# Patient Record
Sex: MALE
[De-identification: names, ages, dates, MRNs, and addresses within clinical notes are randomized per-mention and may not be internally consistent; named-entity substitution may affect disease eponyms.]

---

## 2017-10-09 NOTE — RAD
THREE VIEWS RIGHT GREAT TOE:

 

HISTORY: 

Pain.  Possible infection.  Amputation 5 years ago.

 

FINDINGS: 

AP, lateral, and oblique views of right great toe are obtained.

 

There appear to be some areas of soft tissue heterogeneity medial to the right great toe extending a
ll the way to the possible cortical surface of the proximal phalanx distal aspect of the right great
 toe.  The patient has had previous amputation distal to this; however, the remaining bone in the pr
oximal phalanx especially distally appears to be somewhat frayed.  There may be involvement of this 
bone with infectious process including osteomyelitis.

 

There also appear to be some erosive changes involving the distal phalanx of the 2nd digit which is 
visualized.

 

IMPRESSION: 

Possible erosive osseous changes in the distal aspect of 1st and 2nd digits.

 

POS: AKIKO
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
## 2017-10-09 NOTE — PDOC.PN
- Subjective


Encounter Start Date: 10/09/17


Encounter Start Time: 10:05


Subjective: no pain or fever





- Objective


Resuscitation Status: 


 











Resuscitation Status           FULL:Full Resuscitation














MAR Reviewed: Yes


Vital Signs & Weight: 


 Vital Signs (12 hours)











  Temp Pulse Resp BP BP Pulse Ox


 


 10/09/17 08:17   67   136/87  


 


 10/09/17 05:25  98.4 F  76  18   131/84  97


 


 10/09/17 01:45  98.3 F  74  18   147/93 H  98


 


 10/09/17 01:31       98








 Weight











Weight                         175 lb 1.6 oz














I&O: 


 











 10/08/17 10/09/17 10/10/17





 06:59 06:59 06:59


 


Intake Total  650 


 


Balance  650 











Result Diagrams: 


 10/09/17 04:21





 10/09/17 04:21


Additional Labs: 


 Accuchecks











  10/09/17 10/09/17





  05:16 02:16


 


POC Glucose  331 H  376 H














Phys Exam





- Physical Examination


Constitutional: NAD


Neck: no JVD


Respiratory: clear to auscultation bilateral


Cardiovascular: RRR, no significant murmur


Gastrointestinal: soft, non-tender, positive bowel sounds


Musculoskeletal: no edema


draining lesion R great toe





Dx/Plan


(1) Osteomyelitis of toe of right foot


Code(s): M86.9 - OSTEOMYELITIS, UNSPECIFIED   Status: Acute   





(2) DM type 2, uncontrolled, with lower extremity ulcer


Code(s): E11.622 - TYPE 2 DIABETES MELLITUS WITH OTHER SKIN ULCER; E11.65 - 

TYPE 2 DIABETES MELLITUS WITH HYPERGLYCEMIA; L97.909 - NON-PRS CHRONIC ULC UNSP 

PRT OF UNSP LOW LEG W UNSP SEVERITY   Status: Chronic   





(3) HTN (hypertension)


Code(s): I10 - ESSENTIAL (PRIMARY) HYPERTENSION   Status: Chronic   


Qualifiers: 


   Hypertension type: essential hypertension   Qualified Code(s): I10 - 

Essential (primary) hypertension   





(4) Dyslipidemia


Code(s): E78.5 - HYPERLIPIDEMIA, UNSPECIFIED   Status: Acute   





- Plan


amputation planned


-: cont accu/ss/ etc


-: cont iv antibx for now





* .

## 2017-10-09 NOTE — OP
DATE OF PROCEDURE:  10/09/2017

 

PREOPERATIVE DIAGNOSES:  Diabetic infection of right foot with osteomyelitis, right second toe dista
l phalanx and right first toe and metatarsal with open neuropathic ulcer, plantar right second toe/m
etatarsal, septic diabetic foot.  No evidence of peripheral arterial disease.

 

POSTOPERATIVE DIAGNOSES:  Diabetic infection of right foot with osteomyelitis, right second toe dist
al phalanx and right first toe and metatarsal with open neuropathic ulcer, plantar right second toe/
metatarsal, septic diabetic foot.  No evidence of peripheral arterial disease.

 

PROCEDURE:  Amputation of right great toe and metatarsal.  Wound left open for healing by secondary 
intention.  Good pulsatile blood flow, ligation of pulsatile pedal vessels required.  Amputation of 
right second toe through the proximal phalanx.

 

SURGEON:  Dr. Héctor Millan.

 

ANESTHESIA:  General.  Local none.

 

FINDINGS:  Excellent blood supply.  Culture submitted from the right first toe.  Dry dressings appli
ed and plan to place a wound VAC tomorrow.

 

PROCEDURE IN DETAIL:  The patient was taken to the operating room where under general anesthesia, th
e right lower extremity was prepared with chloraprep, draped in routine fashion.  Incision made for 
an amputation of right second toe through the proximal phalanx down through the skin and subcutaneou
s tissue and transected with a bone cutter, resected proximally with rongeurs.  Hemostasis gained wi
th cautery.  Wound irrigated.  Connective tissue debrided.

 

Incision made for amputation of right first toe and metatarsal with a racquet type incision preservi
ng much plantar skin as possible.  Incision carried down through the skin and subcutaneous tissue an
d the metatarsal, transected with a bone cutter, resected proximally with rongeur.  There was pulsat
ile vessels requiring ligation with 4-0 Vicryl.  Good hemostasis obtained with the cautery.  Connect
erlinda tissue debrided sharply.  Good hemostasis obtained.  Irrigant applied.  Irrigant evacuated.  Nor
mal saline wet to dry dressings applied.  Patient tolerated the procedure well.

## 2017-10-09 NOTE — HP
PRIMARY CARE PHYSICIAN:  [facility].

 

REASON FOR ADMISSION:  Right great toe diabetic toe ulcer with cellulitis.

 

HISTORY OF PRESENT ILLNESS:  A 37-year-old  male who has history of 
diabetes type 2, diabetic neuropathy, and hypertension who was brought to 
emergency room for ulcer over right great toe and swelling, erythema and 
tenderness over right great toe.

 

The patient is Albanian speaking only, but his wife is present at bedside who 
provided history and who also helped interpreting to get history from patient.

 

About more than years ago, the patient had history of step down to nail and 
subsequently, he had minor lesion in the plantar aspect of the right great toe.
  About 2 weeks ago, the patient noticed blister and patient continued to be 
wear his shoe.  He was not feeling any sensation and blister breakdown and 
since then, the patient developed ulcer over right great toe.  The patient also 
noticed that right great toe was getting more swollen, erythematous, and 
tender.  The patient was having subjective feverish.  He was feeling chills at 
home.  The patient ignored to come to the emergency room.  Today, the patient 
was brought to the ER by his wife.

 

The patient does have a history of osteomyelitis and required distal right 
great toe amputation.  In the emergency room, this patient also found with 
hyperglycemia.  The patient was given vancomycin and Zosyn and IV fluid and we 
are admitting this patient for further treatment.

 

PAST MEDICAL HISTORY:  Diabetes type 2, insulin requiring; hypertension; 
diabetic neuropathy; dyslipidemia.

 

PAST SURGICAL HISTORY:  Distal right great toe amputation.

 

PAST PSYCHIATRIC HISTORY:  Anxiety and depression.

 

SOCIAL HISTORY:  The patient drinks alcohol socially.  He is .  He 
denies any tobacco or other illicit drug abuse.

 

ALLERGIES:  No known drug allergies.

 

CURRENT HOME MEDICATIONS:  NovoLog 70/30, 25 units subcutaneously twice daily, 
Lyrica 75 mg daily, clonidine 0.1 mg p.o. daily.

 

REVIEW OF SYSTEMS:  The following complete review of systems was negative, 
unless otherwise mentioned in the HPI or below:

Constitutional:  Weight loss or gain, ability to conduct usual activities.

Skin:  Rash, itching.

Eyes:  Double vision, pain.

ENT/Mouth:  Nose bleeding, neck stiffness, pain, tenderness.

Cardiovascular:  Palpitations, dyspnea on exertion, orthopnea.

Respiratory:  Shortness of breath, wheezing, cough, hemoptysis, fever or night 
sweats.

Gastrointestinal:  Poor appetite, abdominal pain, heartburn, nausea, vomiting, 
constipation, or diarrhea.

Genitourinary:  Urgency, frequency, dysuria, nocturia.

Musculoskeletal:  Pain, swelling.

Neurologic/Psychiatric:  Anxiety, depression.

Allergy/Immunologic:  Skin rash, bleeding tendency.

 

Please see my HPI for pertinent positives and negatives.  All other review of 
systems reviewed and negative except as mentioned in the HPI.

 

EMERGENCY ROOM COURSE:  The patient is given vancomycin with Zosyn 4.5 gram, 
and IV fluid 1 liter.

 

FAMILY HISTORY:  No strong family history of premature coronary artery disease, 
stroke or cancer, though diabetes runs among several family members.

 

PHYSICAL EXAMINATION:

VITAL SIGNS:  On arrival, blood pressure 143/92, pulse 94, respiratory rate 20, 
temperature 98.5, saturation 98% on room air, weight 79.3 kilograms.

GENERAL:  The patient is currently alert, awake, no obvious acute distress.

HEENT:  Head:  Normocephalic, atraumatic.  Eyes:  Pupils round, reactive to 
light.  Extraocular muscle intact.  ENT:  Oropharynx within normal limits.  
Moist mucous membranes.  No oral lesions.  No pharyngeal erythema, no exudate.

NECK:  Supple.  Range of motion is normal.  No meningeal signs of irritation.

LUNGS:  Clear to auscultation without any rhonchi or rales.

CARDIAC:  S1, S2 regular without any murmur.

ABDOMEN:  Soft, bowel sounds present, nontender, nondistended.  No organomegaly
, no mass, no suprapubic tenderness.

BACK EXAMINATION:  Unremarkable, no CVA tenderness.

EXTREMITIES:  Upper extremities:  Passive movement of all joints are normal.  
Lower extremities:  Right great toe is macerated, swollen, erythematous, and 
tender and also had ulcer on the plantar aspect, which draining yellowish pus-
like drainage.  The patient also has swelling around the dorsal aspect of right 
foot.  Good pulsation.

NEUROLOGIC:  Nonfocal examination.  The patient moves all 4 limbs.  Plantar 
bilateral flexor.

PSYCHIATRIC:  Normal affect.

 

SIGNIFICANT LABS:

1.  We are going to order toe x-ray.

2.  CBC:  WBC 6.5, hemoglobin 12.3, platelets 327, ESR 35.  BMP:  Sodium 133, 
potassium 4.0, chloride 99, carbon dioxide 24, anion gap 14, BUN 25, creatinine 
1.12, glucose 502, calcium 8.7, lactic acid 1.8.

3.  LFT:  AST 16, ALT 13, alkaline phosphatase 167, albumin 3.5.  CRP 5.91.

 

ASSESSMENT AND PLAN/IMPRESSION:

1.  Right great toe diabetic ulcer with cellulitis and concern of underlying 
osteomyelitis.  This patient will need admission.  He will need wound care team 
consultation for wound care.  We will obtain MRI of right lower extremity to 
rule out osteomyelitis.  The patient will be given broad spectrum antibiotic 
therapy with vancomycin and Zosyn.  We will consult Dr. Millan tomorrow.  The 
patient will need aggressive diabetes control.

2.  Hyperglycemia associated with diabetes.  The patient's blood sugar is out 
of control.  At this time, we will continue with Humulin regular insulin as per 
aggressive sliding scale and we will continue insulin 70/30, 25 units subcu 
b.i.d. and adjust insulin dose while in hospital.

3.  Dyslipidemia.  We will start Lipitor 10 mg p.o. at bedtime.

4.  Diabetic neuropathy.  We will continue Lyrica 75 mg p.o. daily.

5.  Hypertension.  We will continue clonidine 0.1 mg p.o. daily and consider 
lisinopril 5 mg p.o. daily.

6.  Deep venous thrombosis prophylaxis.  Lovenox 40 mg subcu daily.

7.  Anxiety with depression.  The patient is not on any specific treatment at 
this point.

8.  Deep venous thrombosis prophylaxis, Lovenox 40 mg subcu daily.

9.  Gastrointestinal prophylaxis.  Pepcid 20 mg p.o. b.i.d.

10.  Code status:  The patient is FULL CODE.  The patient's wife is surrogate 
decision maker.

 

Disposition plan based on clinical course.  We are expecting patient's stay in 
the hospital more than 2 midnights.  Plan of care discussed with the patient 
and his wife at bedside in the emergency room.

 

[place]

## 2017-10-10 NOTE — PDOC.PN
- Subjective


Encounter Start Date: 10/10/17


Encounter Start Time: 11:00


Subjective: pain is better, is amb in room





- Objective


Resuscitation Status: 


 











Resuscitation Status           FULL:Full Resuscitation














MAR Reviewed: Yes


Vital Signs & Weight: 


 Vital Signs (12 hours)











  Temp Pulse Resp BP BP Pulse Ox


 


 10/10/17 08:07  98.2 F  77  20   116/84  97


 


 10/10/17 08:00  98.4 F  79  16   125/81  97


 


 10/10/17 07:50   79   116/84  


 


 10/10/17 04:00  98.2 F  83  16   132/66  97


 


 10/10/17 00:00  98.2 F  83  14   120/79  96








 Weight











Admit Weight                   175 lb 1.6 oz


 


Weight                         175 lb 1.6 oz














I&O: 


 











 10/09/17 10/10/17 10/11/17





 06:59 06:59 06:59


 


Intake Total 650 450 


 


Balance 650 450 











Result Diagrams: 


 10/09/17 04:21





 10/09/17 04:21


Additional Labs: 


 Accuchecks











  10/10/17 10/10/17 10/09/17





  11:09 04:01 22:47


 


POC Glucose  126 H  205 H  227 H














  10/09/17 10/09/17





  21:06 16:37


 


POC Glucose  278 H  186 H














Phys Exam





- Physical Examination


HEENT: PERRLA, moist MMs


Neck: no JVD, supple


Respiratory: no wheezing, no rales


Cardiovascular: RRR, no significant murmur


Gastrointestinal: soft, non-tender, positive bowel sounds


Musculoskeletal: pulses present


right forefoot in dressing


Neurological: non-focal, moves all 4 limbs


Psychiatric: A&O x 3





Dx/Plan


(1) Osteomyelitis of toe of right foot


Code(s): M86.9 - OSTEOMYELITIS, UNSPECIFIED   Status: Acute   Comment: s/p 

right gr toe with metatarsal amp and sec toe amp, 10/9/17   





(2) Dyslipidemia


Code(s): E78.5 - HYPERLIPIDEMIA, UNSPECIFIED   Status: Chronic   





(3) DM type 2, uncontrolled, with lower extremity ulcer


Code(s): E11.622 - TYPE 2 DIABETES MELLITUS WITH OTHER SKIN ULCER; E11.65 - 

TYPE 2 DIABETES MELLITUS WITH HYPERGLYCEMIA; L97.909 - NON-PRS CHRONIC ULC UNSP 

PRT OF UNSP LOW LEG W UNSP SEVERITY   Status: Chronic   





(4) HTN (hypertension)


Code(s): I10 - ESSENTIAL (PRIMARY) HYPERTENSION   Status: Chronic   


Qualifiers: 


   Hypertension type: essential hypertension   Qualified Code(s): I10 - 

Essential (primary) hypertension   





- Plan


is on vanc and zosyn


-: wound vac needs to be arranged for home use


-: increase 70/30 to 30u bid


-: d/w pt and wife at bedside





* .








Review of Systems





- Medications/Allergies


Allergies/Adverse Reactions: 


 Allergies











Allergy/AdvReac Type Severity Reaction Status Date / Time


 


No Known Allergies Allergy   Unverified 03/15/14 19:43











Medications: 


 Current Medications





Acetaminophen (Tylenol)  650 mg PO Q4H PRN


   PRN Reason: Headache/Fever or Pain


Acetaminophen (Tylenol)  1,000 mg PO Q6H PRN


   PRN Reason: Moderate to Severe Pain (6-10)


   Last Admin: 10/09/17 17:17 Dose:  1,000 mg


Al Hydroxide/Mg Hydroxide (Maalox)  30 ml PO Q6H PRN


   PRN Reason: Heartburn  or Indigestion


Artificial Tears (Tears Naturale)  0 drop EA EYE PRN PRN


   PRN Reason: Dry Eyes


Atorvastatin Calcium (Lipitor)  10 mg PO HS [facility]


   Last Admin: 10/09/17 20:08 Dose:  10 mg


Dextrose/Water (Dextrose 50%)  25 gm SLOW IVP PRN PRN


   PRN Reason: Hypoglycemia


Enoxaparin Sodium (Lovenox)  40 mg SC 0900 [facility]


   Last Admin: 10/10/17 07:48 Dose:  40 mg


Famotidine (Pepcid)  20 mg PO BID [facility]


   Last Admin: 10/10/17 07:49 Dose:  20 mg


Glucagon (Glucagon)  1 mg IM PRN PRN


   PRN Reason: Hypoglycemia


Guaifenesin (Robitussin Sf)  200 mg PO Q4H PRN


   PRN Reason: Cough


Hydralazine HCl (Apresoline)  10 mg SLOW IVP Q4H PRN


   PRN Reason: Systolic BP > 180


Dextrose/Water (D5w)  1,000 mls @ 0 mls/hr IV .Q0M PRN; As Directed


   PRN Reason: Hypoglycemia


Piperacillin Sod/Tazobactam (Sod 4.5 gm/ Sodium Chloride)  100 mls @ 200 mls/hr 

IVPB Q6HR [facility]


   Last Admin: 10/10/17 05:46 Dose:  100 mls


Vancomycin HCl 1.25 gm/ Sodium (Chloride)  250 mls @ 166.667 mls/hr IVPB 0100,

1300 [facility]


   Last Admin: 10/10/17 01:29 Dose:  250 mls


Ibuprofen (Motrin)  600 mg PO Q6H PRN


   PRN Reason: Mild Pain (1-3)


Insulin Human Isoph/Insulin Regular (Humulin 70/30)  30 units SC BID [facility]


   Last Admin: 10/10/17 07:55 Dose:  30 unit


Insulin Human Regular (Humulin R)  0 units SC .MODERATE SLIDING SC PRN


   PRN Reason: Moderate Correctional Scale


   Last Admin: 10/10/17 05:50 Dose:  4 unit


Insulin Human Regular (Humulin R)  0 units SC .BEDTIME SLIDING SC PRN


   PRN Reason: Bedtime Correctional Scale


   Last Admin: 10/09/17 22:48 Dose:  2 unit


Lisinopril (Zestril)  5 mg PO DAILY [facility]


   Last Admin: 10/10/17 07:50 Dose:  5 mg


Loratadine (Claritin)  10 mg PO DAILYPRN PRN


   PRN Reason: Sinus Symptoms


Magnesium Hydroxide (Milk Of Magnesium)  30 ml PO DAILYPRN PRN


   PRN Reason: Constipation


Mineral Oil/White Petrolatum (Eucerin Cream)  0 gm TOP BIDPRN PRN


   PRN Reason: Dry Skin


Miscellaneous Medication (Pharmacy To Dose)  1 each IVPB PRN PRN


   PRN Reason: Pharmacy to dose


Ondansetron HCl (Zofran Odt)  4 mg PO Q6H PRN


   PRN Reason: Nausea/Vomiting


Ondansetron HCl (Zofran)  4 mg IVP Q6H PRN


   PRN Reason: Nausea/Vomiting


   Last Admin: 10/09/17 16:33 Dose:  4 mg


Polyethylene Glycol (Miralax)  17 gm PO DAILY [facility]


   Last Admin: 10/10/17 07:51 Dose:  17 gm


Pregabalin (Lyrica)  75 mg PO DAILY [facility]


   Last Admin: 10/10/17 07:49 Dose:  75 mg


Senna (Senokot)  2 tab PO HSPRN PRN


   PRN Reason: Constipation


Sodium Chloride (Ocean Nasal Spray 0.65%)  0 ml EA NARE QIDPRN PRN


   PRN Reason: Nasal Congestion


Sodium Chloride (Flush - Normal Saline)  10 ml IVF Q12HR [facility]


   Last Admin: 10/10/17 07:51 Dose:  10 ml


Sodium Chloride (Flush - Normal Saline)  10 ml IVF PRN PRN


   PRN Reason: Saline Flush


   Last Admin: 10/09/17 05:15 Dose:  10 ml


Tramadol HCl (Ultram)  50 mg PO Q6H PRN


   PRN Reason: Moderate Pain (4-6)


Tramadol HCl (Ultram)  100 mg PO Q6H PRN


   PRN Reason: Severe Pain (7-10)


   Last Admin: 10/10/17 08:47 Dose:  100 mg


Zolpidem Tartrate (Ambien)  5 mg PO HSPRN PRN


   PRN Reason: Insomnia

## 2017-10-10 NOTE — PRG
DATE OF SERVICE:  10/10/2017

 

SUBJECTIVE:  Mr. Urbina is doing well today.  He underwent a partial amputation of his foot yest
erday, partial amputation of the right second toe and amputation of first toe and metatarsal.  This 
morning, the wound looks good.  There is healthy granulation tissue.  He is having minimal pain.  He
 is afebrile.  Hemoglobin 11.6 and white count 5.7.  Basic metabolic profile is unremarkable.  Cultu
res intraoperatively revealed beta-hemolytic strep.  Blood cultures have been negative.

 

ASSESSMENT AND PLAN:  The patient has an open wound of the right foot.  The wound should heal withou
t problems.  There is no evidence of peripheral arterial disease.  He had pulsatile bleeding, requir
ing suture ligation cautery at the time of surgery.  Overall, the wound looks good.  We will apply t
he wound VAC today, Tuesday, and do not have to change until Friday.  We would recommend he be disch
arged home on Thursday with outpatient [facility] wound care appointment on Friday for outpatient VAC care.
  He can be discharged on oral antibiotics on Thursday.  He should keep his heel off the bed to prev
ent decubitus.  He should follow up in my office in 2-3 weeks or otherwise I will see him in wound c
are during wound VAC change in about 2 weeks.

## 2017-10-11 NOTE — PDOC.PN
- Subjective


Encounter Start Date: 10/11/17


Encounter Start Time: 11:00


Subjective: had lot of pain with dressing change yesterday


-: feels better now





- Objective


Resuscitation Status: 


 











Resuscitation Status           FULL:Full Resuscitation














MAR Reviewed: Yes


Vital Signs & Weight: 


 Vital Signs (12 hours)











  Temp Pulse Resp BP BP Pulse Ox


 


 10/11/17 09:22   72   151/98 H  


 


 10/11/17 08:00  97.8 F  72  16   


 


 10/11/17 07:13  97.8 F  72  16   151/98 H  97








 Weight











Admit Weight                   175 lb 1.6 oz


 


Weight                         175 lb 1.6 oz














I&O: 


 











 10/10/17 10/11/17 10/12/17





 06:59 06:59 06:59


 


Intake Total 450 1050 


 


Balance 450 1050 











Result Diagrams: 


 10/09/17 04:21





 10/09/17 04:21


Additional Labs: 


 Accuchecks











  10/11/17 10/11/17 10/10/17





  10:56 05:00 19:57


 


POC Glucose  219 H  169 H  136 H














  10/10/17





  16:13


 


POC Glucose  204 H














Phys Exam





- Physical Examination


HEENT: PERRLA, moist MMs


Neck: no JVD, supple


Respiratory: no wheezing, no rales


Cardiovascular: RRR, no significant murmur


Gastrointestinal: soft, non-tender, positive bowel sounds


Musculoskeletal: no edema, pulses present


right foot in wound vac


Neurological: non-focal, moves all 4 limbs


Psychiatric: A&O x 3





Dx/Plan


(1) Osteomyelitis of toe of right foot


Code(s): M86.9 - OSTEOMYELITIS, UNSPECIFIED   Status: Acute   Comment: s/p 

right gr toe with metatarsal amp and sec toe amp, 10/9/17   





(2) Dyslipidemia


Code(s): E78.5 - HYPERLIPIDEMIA, UNSPECIFIED   Status: Chronic   





(3) DM type 2, uncontrolled, with lower extremity ulcer


Code(s): E11.622 - TYPE 2 DIABETES MELLITUS WITH OTHER SKIN ULCER; E11.65 - 

TYPE 2 DIABETES MELLITUS WITH HYPERGLYCEMIA; L97.909 - NON-PRS CHRONIC ULC UNSP 

PRT OF UNSP LOW LEG W UNSP SEVERITY   Status: Chronic   





(4) HTN (hypertension)


Code(s): I10 - ESSENTIAL (PRIMARY) HYPERTENSION   Status: Chronic   


Qualifiers: 


   Hypertension type: essential hypertension   Qualified Code(s): I10 - 

Essential (primary) hypertension   





- Plan


is on vanc and zosyn


-: await full cultures


-: outpt wound care and vac to be arranged


-: dc plan in am


-: is on 70/30 insulin 30u bid





* .








Review of Systems





- Medications/Allergies


Allergies/Adverse Reactions: 


 Allergies











Allergy/AdvReac Type Severity Reaction Status Date / Time


 


No Known Allergies Allergy   Unverified 03/15/14 19:43











Medications: 


 Current Medications





Acetaminophen (Tylenol)  650 mg PO Q4H PRN


   PRN Reason: Headache/Fever or Pain


Acetaminophen (Tylenol)  1,000 mg PO Q6H PRN


   PRN Reason: Moderate to Severe Pain (6-10)


   Last Admin: 10/10/17 15:09 Dose:  1,000 mg


Al Hydroxide/Mg Hydroxide (Maalox)  30 ml PO Q6H PRN


   PRN Reason: Heartburn  or Indigestion


Artificial Tears (Tears Naturale)  0 drop EA EYE PRN PRN


   PRN Reason: Dry Eyes


Atorvastatin Calcium (Lipitor)  10 mg PO HS [facility]


   Last Admin: 10/10/17 21:33 Dose:  10 mg


Dextrose/Water (Dextrose 50%)  25 gm SLOW IVP PRN PRN


   PRN Reason: Hypoglycemia


Enoxaparin Sodium (Lovenox)  40 mg SC 0900 [facility]


   Last Admin: 10/11/17 09:22 Dose:  40 mg


Famotidine (Pepcid)  20 mg PO BID [facility]


   Last Admin: 10/11/17 09:21 Dose:  20 mg


Glucagon (Glucagon)  1 mg IM PRN PRN


   PRN Reason: Hypoglycemia


Guaifenesin (Robitussin Sf)  200 mg PO Q4H PRN


   PRN Reason: Cough


Hydralazine HCl (Apresoline)  10 mg SLOW IVP Q4H PRN


   PRN Reason: Systolic BP > 180


Dextrose/Water (D5w)  1,000 mls @ 0 mls/hr IV .Q0M PRN; As Directed


   PRN Reason: Hypoglycemia


Piperacillin Sod/Tazobactam (Sod 4.5 gm/ Sodium Chloride)  100 mls @ 200 mls/hr 

IVPB Q6HR [facility]


   Last Admin: 10/11/17 11:54 Dose:  100 mls


Vancomycin HCl 1.75 gm/ Sodium (Chloride)  500 mls @ 250 mls/hr IVPB 0200,1400 

[facility]


   Last Admin: 10/11/17 13:00 Dose:  500 mls


Ibuprofen (Motrin)  600 mg PO Q6H PRN


   PRN Reason: Mild Pain (1-3)


Insulin Human Isoph/Insulin Regular (Humulin 70/30)  30 units SC BID [facility]


   Last Admin: 10/11/17 09:23 Dose:  30 unit


Insulin Human Regular (Humulin R)  0 units SC .MODERATE SLIDING SC PRN


   PRN Reason: Moderate Correctional Scale


   Last Admin: 10/11/17 11:55 Dose:  4 unit


Insulin Human Regular (Humulin R)  0 units SC .BEDTIME SLIDING SC PRN


   PRN Reason: Bedtime Correctional Scale


   Last Admin: 10/09/17 22:48 Dose:  2 unit


Lisinopril (Zestril)  5 mg PO DAILY [facility]


   Last Admin: 10/11/17 09:22 Dose:  5 mg


Loratadine (Claritin)  10 mg PO DAILYPRN PRN


   PRN Reason: Sinus Symptoms


Magnesium Hydroxide (Milk Of Magnesium)  30 ml PO DAILYPRN PRN


   PRN Reason: Constipation


Mineral Oil/White Petrolatum (Eucerin Cream)  0 gm TOP BIDPRN PRN


   PRN Reason: Dry Skin


Miscellaneous Medication (Pharmacy To Dose)  1 each IVPB PRN PRN


   PRN Reason: Pharmacy to dose


Ondansetron HCl (Zofran Odt)  4 mg PO Q6H PRN


   PRN Reason: Nausea/Vomiting


Ondansetron HCl (Zofran)  4 mg IVP Q6H PRN


   PRN Reason: Nausea/Vomiting


   Last Admin: 10/09/17 16:33 Dose:  4 mg


Polyethylene Glycol (Miralax)  17 gm PO DAILY [facility]


   Last Admin: 10/11/17 09:22 Dose:  17 gm


Pregabalin (Lyrica)  75 mg PO DAILY [facility]


   Last Admin: 10/11/17 09:22 Dose:  75 mg


Senna (Senokot)  2 tab PO HSPRN PRN


   PRN Reason: Constipation


Sodium Chloride (Ocean Nasal Spray 0.65%)  0 ml EA NARE QIDPRN PRN


   PRN Reason: Nasal Congestion


Sodium Chloride (Flush - Normal Saline)  10 ml IVF Q12HR [facility]


   Last Admin: 10/11/17 09:26 Dose:  10 ml


Sodium Chloride (Flush - Normal Saline)  10 ml IVF PRN PRN


   PRN Reason: Saline Flush


   Last Admin: 10/09/17 05:15 Dose:  10 ml


Tramadol HCl (Ultram)  50 mg PO Q6H PRN


   PRN Reason: Moderate Pain (4-6)


Tramadol HCl (Ultram)  100 mg PO Q6H PRN


   PRN Reason: Severe Pain (7-10)


   Last Admin: 10/10/17 21:36 Dose:  100 mg


Zolpidem Tartrate (Ambien)  5 mg PO HSPRN PRN


   PRN Reason: Insomnia

## 2017-10-13 NOTE — PDOC.PN
- Subjective


Encounter Start Date: 10/13/17


Encounter Start Time: 11:50


Subjective: feels better





- Objective


Resuscitation Status: 


 











Resuscitation Status           FULL:Full Resuscitation














MAR Reviewed: Yes


Vital Signs & Weight: 


 Vital Signs (12 hours)











  Temp Pulse Resp BP Pulse Ox


 


 10/13/17 11:25  98.1 F  70  16  134/92 H  98


 


 10/13/17 08:00  98.1 F  70  16  


 


 10/13/17 07:17  97.4 F L  68  16  152/91 H  100


 


 10/13/17 04:40      98








 Weight











Admit Weight                   175 lb 1.6 oz


 


Weight                         175 lb 1.6 oz














I&O: 


 











 10/12/17 10/13/17 10/14/17





 06:59 06:59 06:59


 


Intake Total 1200 3270 


 


Balance 1200 3270 











Result Diagrams: 


 10/09/17 04:21





 10/13/17 08:18


Additional Labs: 


 Accuchecks











  10/13/17 10/13/17 10/12/17





  11:25 03:46 19:44


 


POC Glucose  272 H  215 H  204 H














  10/12/17





  16:35


 


POC Glucose  96














Phys Exam





- Physical Examination


HEENT: PERRLA, moist MMs


Neck: no JVD, supple


Respiratory: no wheezing, no rales


Cardiovascular: RRR, no significant murmur


Gastrointestinal: soft, non-tender, positive bowel sounds


Musculoskeletal: no edema, pulses present


Neurological: non-focal, moves all 4 limbs


right foot in wound vac


Psychiatric: A&O x 3





Dx/Plan


(1) Osteomyelitis of toe of right foot


Code(s): M86.9 - OSTEOMYELITIS, UNSPECIFIED   Status: Acute   Comment: s/p 

right gr toe with metatarsal amp and sec toe amp, 10/9/17   





(2) Dyslipidemia


Code(s): E78.5 - HYPERLIPIDEMIA, UNSPECIFIED   Status: Chronic   





(3) DM type 2, uncontrolled, with lower extremity ulcer


Code(s): E11.622 - TYPE 2 DIABETES MELLITUS WITH OTHER SKIN ULCER; E11.65 - 

TYPE 2 DIABETES MELLITUS WITH HYPERGLYCEMIA; L97.909 - NON-PRS CHRONIC ULC UNSP 

PRT OF UNSP LOW LEG W UNSP SEVERITY   Status: Chronic   





(4) HTN (hypertension)


Code(s): I10 - ESSENTIAL (PRIMARY) HYPERTENSION   Status: Chronic   


Qualifiers: 


   Hypertension type: essential hypertension   Qualified Code(s): I10 - 

Essential (primary) hypertension   





- Plan


awaiting wound vac for discharge


-: is on vanc and zosyn, augmentin for dc plan


-: is amb in hallway


-: may dc anytime if wound vac is arranged with wound care





* .








Review of Systems





- Medications/Allergies


Allergies/Adverse Reactions: 


 Allergies











Allergy/AdvReac Type Severity Reaction Status Date / Time


 


No Known Allergies Allergy   Unverified 03/15/14 19:43











Medications: 


 Current Medications





Acetaminophen (Tylenol)  650 mg PO Q4H PRN


   PRN Reason: Headache/Fever or Pain


Acetaminophen (Tylenol)  1,000 mg PO Q6H PRN


   PRN Reason: Moderate to Severe Pain (6-10)


   Last Admin: 10/10/17 15:09 Dose:  1,000 mg


Al Hydroxide/Mg Hydroxide (Maalox)  30 ml PO Q6H PRN


   PRN Reason: Heartburn  or Indigestion


Artificial Tears (Tears Naturale)  0 drop EA EYE PRN PRN


   PRN Reason: Dry Eyes


Atorvastatin Calcium (Lipitor)  10 mg PO HS [facility]


   Last Admin: 10/12/17 20:22 Dose:  10 mg


Dextrose/Water (Dextrose 50%)  25 gm SLOW IVP PRN PRN


   PRN Reason: Hypoglycemia


Enoxaparin Sodium (Lovenox)  40 mg SC 0900 [facility]


   Last Admin: 10/13/17 08:49 Dose:  40 mg


Famotidine (Pepcid)  20 mg PO BID [facility]


   Last Admin: 10/13/17 08:47 Dose:  20 mg


Glucagon (Glucagon)  1 mg IM PRN PRN


   PRN Reason: Hypoglycemia


Guaifenesin (Robitussin Sf)  200 mg PO Q4H PRN


   PRN Reason: Cough


Hydralazine HCl (Apresoline)  10 mg SLOW IVP Q4H PRN


   PRN Reason: Systolic BP > 180


Dextrose/Water (D5w)  1,000 mls @ 0 mls/hr IV .Q0M PRN; As Directed


   PRN Reason: Hypoglycemia


Piperacillin Sod/Tazobactam (Sod 4.5 gm/ Sodium Chloride)  100 mls @ 200 mls/hr 

IVPB Q6HR [facility]


   Last Admin: 10/13/17 14:04 Dose:  100 mls


Vancomycin HCl 1.75 gm/ Sodium (Chloride)  500 mls @ 250 mls/hr IVPB 0200,1400 

[facility]


   Last Admin: 10/13/17 02:49 Dose:  500 mls


Ibuprofen (Motrin)  600 mg PO Q6H PRN


   PRN Reason: Mild Pain (1-3)


Insulin Human Isoph/Insulin Regular (Humulin 70/30)  35 units SC BID [facility]


   Last Admin: 10/13/17 09:01 Dose:  35 unit


Insulin Human Regular (Humulin R)  0 units SC .MODERATE SLIDING SC PRN


   PRN Reason: Moderate Correctional Scale


   Last Admin: 10/13/17 14:10 Dose:  6 unit


Insulin Human Regular (Humulin R)  0 units SC .BEDTIME SLIDING SC PRN


   PRN Reason: Bedtime Correctional Scale


   Last Admin: 10/09/17 22:48 Dose:  2 unit


Lisinopril (Zestril)  5 mg PO DAILY [facility]


   Last Admin: 10/13/17 08:48 Dose:  5 mg


Loratadine (Claritin)  10 mg PO DAILYPRN PRN


   PRN Reason: Sinus Symptoms


Magnesium Hydroxide (Milk Of Magnesium)  30 ml PO DAILYPRN PRN


   PRN Reason: Constipation


Mineral Oil/White Petrolatum (Eucerin Cream)  0 gm TOP BIDPRN PRN


   PRN Reason: Dry Skin


Miscellaneous Medication (Pharmacy To Dose)  1 each IVPB PRN PRN


   PRN Reason: Pharmacy to dose


Morphine Sulfate (Morphine Sulfate)  2 mg SLOW IVP Q4H PRN


   PRN Reason: Chest Pain/BP Elevations


   Last Admin: 10/12/17 10:54 Dose:  2 mg


Ondansetron HCl (Zofran Odt)  4 mg PO Q6H PRN


   PRN Reason: Nausea/Vomiting


Ondansetron HCl (Zofran)  4 mg IVP Q6H PRN


   PRN Reason: Nausea/Vomiting


   Last Admin: 10/09/17 16:33 Dose:  4 mg


Polyethylene Glycol (Miralax)  17 gm PO DAILY [facility]


   Last Admin: 10/13/17 08:48 Dose:  17 gm


Pregabalin (Lyrica)  75 mg PO DAILY [facility]


   Last Admin: 10/13/17 08:48 Dose:  75 mg


Senna (Senokot)  2 tab PO HSPRN PRN


   PRN Reason: Constipation


Sodium Chloride (Ocean Nasal Spray 0.65%)  0 ml EA NARE QIDPRN PRN


   PRN Reason: Nasal Congestion


Sodium Chloride (Flush - Normal Saline)  10 ml IVF Q12HR [facility]


   Last Admin: 10/13/17 08:51 Dose:  10 ml


Sodium Chloride (Flush - Normal Saline)  10 ml IVF PRN PRN


   PRN Reason: Saline Flush


   Last Admin: 10/12/17 14:18 Dose:  10 ml


Tramadol HCl (Ultram)  50 mg PO Q6H PRN


   PRN Reason: Moderate Pain (4-6)


Tramadol HCl (Ultram)  100 mg PO Q6H PRN


   PRN Reason: Severe Pain (7-10)


   Last Admin: 10/13/17 00:28 Dose:  100 mg


Zolpidem Tartrate (Ambien)  5 mg PO HSPRN PRN


   PRN Reason: Insomnia

## 2017-10-14 NOTE — PDOC.PN
- Subjective


Encounter Start Date: 10/14/17


Encounter Start Time: 11:25


Subjective: feels better





- Objective


Resuscitation Status: 


 











Resuscitation Status           FULL:Full Resuscitation














MAR Reviewed: Yes


Vital Signs & Weight: 


 Vital Signs (12 hours)











  Temp Pulse Resp BP Pulse Ox


 


 10/14/17 11:52  98.5 F  79  18  149/101 H  98


 


 10/14/17 09:18   72   


 


 10/14/17 08:00  98.5 F  79  18  


 


 10/14/17 07:34  98.6 F  70  18  161/104 H  97


 


 10/14/17 04:00  98.3 F  85  18  150/92 H  92 L








 Weight











Admit Weight                   175 lb 1.6 oz


 


Weight                         175 lb 1.6 oz














I&O: 


 











 10/13/17 10/14/17 10/15/17





 06:59 06:59 06:59


 


Intake Total 3270 1200 


 


Balance 3270 1200 











Result Diagrams: 


 10/09/17 04:21





 10/13/17 08:18


Additional Labs: 


 Accuchecks











  10/14/17 10/13/17 10/13/17





  11:51 19:30 16:33


 


POC Glucose  135 H  152 H  119 H














Phys Exam





- Physical Examination


HEENT: PERRLA, moist MMs


Neck: no JVD, supple


Respiratory: no wheezing, no rales


Cardiovascular: RRR, no significant murmur


Gastrointestinal: soft, non-tender, positive bowel sounds


Musculoskeletal: no edema, pulses present


Neurological: non-focal, moves all 4 limbs


right foot in wound vac


Psychiatric: A&O x 3





Dx/Plan


(1) Osteomyelitis of toe of right foot


Code(s): M86.9 - OSTEOMYELITIS, UNSPECIFIED   Status: Acute   Comment: s/p 

right gr toe with metatarsal amp and sec toe amp, 10/9/17   





(2) Dyslipidemia


Code(s): E78.5 - HYPERLIPIDEMIA, UNSPECIFIED   Status: Chronic   





(3) DM type 2, uncontrolled, with lower extremity ulcer


Code(s): E11.622 - TYPE 2 DIABETES MELLITUS WITH OTHER SKIN ULCER; E11.65 - 

TYPE 2 DIABETES MELLITUS WITH HYPERGLYCEMIA; L97.909 - NON-PRS CHRONIC ULC UNSP 

PRT OF UNSP LOW LEG W UNSP SEVERITY   Status: Chronic   





(4) HTN (hypertension)


Code(s): I10 - ESSENTIAL (PRIMARY) HYPERTENSION   Status: Chronic   


Qualifiers: 


   Hypertension type: essential hypertension   Qualified Code(s): I10 - 

Essential (primary) hypertension   





- Plan


awaiting wound vac for outpt use


-: may anytime if above is arranged


-: is on iv antibiotics, switch to augmentin on dc





* .








Review of Systems





- Medications/Allergies


Allergies/Adverse Reactions: 


 Allergies











Allergy/AdvReac Type Severity Reaction Status Date / Time


 


No Known Allergies Allergy   Unverified 03/15/14 19:43











Medications: 


 Current Medications





Acetaminophen (Tylenol)  650 mg PO Q4H PRN


   PRN Reason: Headache/Fever or Pain


Acetaminophen (Tylenol)  1,000 mg PO Q6H PRN


   PRN Reason: Moderate to Severe Pain (6-10)


   Last Admin: 10/10/17 15:09 Dose:  1,000 mg


Al Hydroxide/Mg Hydroxide (Maalox)  30 ml PO Q6H PRN


   PRN Reason: Heartburn  or Indigestion


Artificial Tears (Tears Naturale)  0 drop EA EYE PRN PRN


   PRN Reason: Dry Eyes


Atorvastatin Calcium (Lipitor)  10 mg PO HS [facility]


   Last Admin: 10/13/17 20:33 Dose:  10 mg


Dextrose/Water (Dextrose 50%)  25 gm SLOW IVP PRN PRN


   PRN Reason: Hypoglycemia


Enoxaparin Sodium (Lovenox)  40 mg SC 0900 [facility]


   Last Admin: 10/14/17 09:18 Dose:  40 mg


Famotidine (Pepcid)  20 mg PO BID [facility]


   Last Admin: 10/14/17 09:18 Dose:  20 mg


Glucagon (Glucagon)  1 mg IM PRN PRN


   PRN Reason: Hypoglycemia


Guaifenesin (Robitussin Sf)  200 mg PO Q4H PRN


   PRN Reason: Cough


Hydralazine HCl (Apresoline)  10 mg SLOW IVP Q4H PRN


   PRN Reason: Systolic BP > 180


Dextrose/Water (D5w)  1,000 mls @ 0 mls/hr IV .Q0M PRN; As Directed


   PRN Reason: Hypoglycemia


Vancomycin HCl 1.75 gm/ Sodium (Chloride)  500 mls @ 250 mls/hr IVPB 0200,1400 

[facility]


   Last Admin: 10/14/17 13:49 Dose:  500 mls


Ibuprofen (Motrin)  600 mg PO Q6H PRN


   PRN Reason: Mild Pain (1-3)


Insulin Human Isoph/Insulin Regular (Humulin 70/30)  35 units SC BID [facility]


   Last Admin: 10/14/17 09:18 Dose:  35 unit


Insulin Human Regular (Humulin R)  0 units SC .MODERATE SLIDING SC PRN


   PRN Reason: Moderate Correctional Scale


   Last Admin: 10/13/17 14:10 Dose:  6 unit


Insulin Human Regular (Humulin R)  0 units SC .BEDTIME SLIDING SC PRN


   PRN Reason: Bedtime Correctional Scale


   Last Admin: 10/09/17 22:48 Dose:  2 unit


Lisinopril (Zestril)  5 mg PO DAILY [facility]


   Last Admin: 10/14/17 09:18 Dose:  5 mg


Loratadine (Claritin)  10 mg PO DAILYPRN PRN


   PRN Reason: Sinus Symptoms


Magnesium Hydroxide (Milk Of Magnesium)  30 ml PO DAILYPRN PRN


   PRN Reason: Constipation


Mineral Oil/White Petrolatum (Eucerin Cream)  0 gm TOP BIDPRN PRN


   PRN Reason: Dry Skin


Miscellaneous Medication (Pharmacy To Dose)  1 each IVPB PRN PRN


   PRN Reason: Pharmacy to dose


Morphine Sulfate (Morphine Sulfate)  2 mg SLOW IVP Q4H PRN


   PRN Reason: Chest Pain/BP Elevations


   Last Admin: 10/12/17 10:54 Dose:  2 mg


Ondansetron HCl (Zofran Odt)  4 mg PO Q6H PRN


   PRN Reason: Nausea/Vomiting


Ondansetron HCl (Zofran)  4 mg IVP Q6H PRN


   PRN Reason: Nausea/Vomiting


   Last Admin: 10/09/17 16:33 Dose:  4 mg


Polyethylene Glycol (Miralax)  17 gm PO DAILY [facility]


   Last Admin: 10/14/17 09:18 Dose:  17 gm


Pregabalin (Lyrica)  75 mg PO DAILY [facility]


   Last Admin: 10/14/17 09:17 Dose:  75 mg


Senna (Senokot)  2 tab PO HSPRN PRN


   PRN Reason: Constipation


Sodium Chloride (Ocean Nasal Spray 0.65%)  0 ml EA NARE QIDPRN PRN


   PRN Reason: Nasal Congestion


Sodium Chloride (Flush - Normal Saline)  10 ml IVF Q12HR [facility]


   Last Admin: 10/14/17 09:19 Dose:  10 ml


Sodium Chloride (Flush - Normal Saline)  10 ml IVF PRN PRN


   PRN Reason: Saline Flush


   Last Admin: 10/12/17 14:18 Dose:  10 ml


Tramadol HCl (Ultram)  50 mg PO Q6H PRN


   PRN Reason: Moderate Pain (4-6)


Tramadol HCl (Ultram)  100 mg PO Q6H PRN


   PRN Reason: Severe Pain (7-10)


   Last Admin: 10/13/17 00:28 Dose:  100 mg


Zolpidem Tartrate (Ambien)  5 mg PO HSPRN PRN


   PRN Reason: Insomnia

## 2017-10-15 NOTE — PDOC.PN
- Subjective


Encounter Start Date: 10/15/17


Encounter Start Time: 07:55


Subjective: feels better, no pain





- Objective


Resuscitation Status: 


 











Resuscitation Status           FULL:Full Resuscitation














MAR Reviewed: Yes


Vital Signs & Weight: 


 Vital Signs (12 hours)











  Temp Pulse Resp BP BP Pulse Ox


 


 10/15/17 08:06   73   162/99 H  


 


 10/15/17 08:00  98.4 F  73  16   


 


 10/15/17 07:58  98.4 F  74  16   162/99 H  98


 


 10/15/17 04:00  98.1 F  77  20   146/89 H  96


 


 10/15/17 00:32  98.8 F  72  20   127/77  97








 Weight











Admit Weight                   175 lb 1.6 oz


 


Weight                         175 lb 1.6 oz














I&O: 


 











 10/14/17 10/15/17 10/16/17





 06:59 06:59 06:59


 


Intake Total 1200 2100 480


 


Balance 1200 2100 480











Result Diagrams: 


 10/09/17 04:21





 10/13/17 08:18


Additional Labs: 


 Accuchecks











  10/15/17 10/15/17 10/14/17





  11:12 04:35 19:32


 


POC Glucose  100  100  202 H














  10/14/17 10/14/17





  15:25 11:51


 


POC Glucose  145 H  135 H














Phys Exam





- Physical Examination


HEENT: PERRLA, moist MMs


Neck: no JVD, supple


Respiratory: no wheezing, no rales


Cardiovascular: RRR, no significant murmur


Gastrointestinal: soft, non-tender, positive bowel sounds


Musculoskeletal: pulses present


right foot in wound vac


Neurological: non-focal, moves all 4 limbs


Psychiatric: A&O x 3





Dx/Plan


(1) Osteomyelitis of toe of right foot


Code(s): M86.9 - OSTEOMYELITIS, UNSPECIFIED   Status: Acute   Comment: s/p 

right gr toe with metatarsal amp and sec toe amp, 10/9/17   





(2) Dyslipidemia


Code(s): E78.5 - HYPERLIPIDEMIA, UNSPECIFIED   Status: Chronic   





(3) DM type 2, uncontrolled, with lower extremity ulcer


Code(s): E11.622 - TYPE 2 DIABETES MELLITUS WITH OTHER SKIN ULCER; E11.65 - 

TYPE 2 DIABETES MELLITUS WITH HYPERGLYCEMIA; L97.909 - NON-PRS CHRONIC ULC UNSP 

PRT OF UNSP LOW LEG W UNSP SEVERITY   Status: Chronic   





(4) HTN (hypertension)


Code(s): I10 - ESSENTIAL (PRIMARY) HYPERTENSION   Status: Chronic   


Qualifiers: 


   Hypertension type: essential hypertension   Qualified Code(s): I10 - 

Essential (primary) hypertension   





- Plan


wound vac has been approved for home use


-: dc pt home


-: to f/u with wound care as set up





* .

## 2019-07-26 NOTE — DIS
DATE OF ADMISSION:  10/09/2017

 

DATE OF DISCHARGE:  10/15/2017

 

DISCHARGE DISPOSITION:  To home.

 

PRIMARY DISCHARGE DIAGNOSES:  Right great toe osteomyelitis status post amputation and also amputati
on of second toe done on 10/09/2017 by Dr. Millan.

 

SECONDARY DISCHARGE DIAGNOSES:  Diabetes mellitus type 2, dyslipidemia, hypertension.

 

PROCEDURES DONE DURING HOSPITALIZATION:  The patient has had amputation of right great toe with meta
tarsal, amputation of right second toe through the proximal phalanx, both for osteomyelitis done by 
Dr. Millan on 10/09/2017.  X-ray of the toe on the right side done on the day of admission showed er
osive osseous changes in the distal aspect of the first and second digits.  Wound cultures grew Stre
p agalactiae and Staph aureus sensitive to Augmentin.  Blood cultures x2 no growth.  An H and H of 1
1 and 34.  Sed rate was 35.  CRP was 5.91 on the day of admission.

 

DISCHARGE MEDICATIONS:  Augmentin 875 mg p.o. twice daily for another one week, Lipitor 10 mg p.o. a
t bedtime, Motrin p.r.n. for pain, Humulin N 70/30, 35 units subcu twice daily, lisinopril 5 mg p.o.
 daily, Ultram p.r.n. for pain.

 

ALLERGIES:  No known drug allergies.

 

INPATIENT CONSULTS:  Dr. Millan for General Surgery.

 

DISCHARGE PLAN:  Patient to follow up with Dr. Millan as advised.  He also needs to follow up with [facility] care.

 

BRIEF COURSE DURING HOSPITALIZATION:  The patient initially got admitted on the 9th with right great
 toe ulcer with cellulitis.  Initial x-ray was suspicious for osteomyelitis.  He has had consultatio
n with Dr. Millan.  Patient has had amputation of right great toe with metatarsals and second toe th
rough the proximal phalanx.  Postop, his wound was placed in a wound VAC.  He has responded well to 
above measures.  He was on IV antibiotics and has been transitioned to Augmentin.  He has remained h
emodynamically stable all through his stay here.  Case management consultation was requested for arr
anging wound VAC and wound care for home use.  This has been accomplished today and he will be short
ly discharged.  Please see the face-to-face documentation on [facility] for the day of discharge. <<----- Click to add NO pertinent Family History

## 2020-03-09 NOTE — RAD
Left foot 3 views:

3/9/2020



COMPARISON: None



HISTORY: Foot infection, ulcer of the fourth toe



FINDINGS: There is prominent soft tissue swelling involving the fourth toe. There is questionable sma
ll volume subcutaneous gas associated with the fourth toe near the proximal interphalangeal joint

which is likely associated with ulceration. A few foci of gas associated with a gas forming infection
 is a possibility. There is subtle decreased definition of the cortical bone near the base of the

fourth middle phalanx which could signify osteomyelitis. Further assessment via MRI of the left foot 
advised.



IMPRESSION: Soft tissue swelling of the fourth toe suggesting cellulitis. Possible punctate foci of r
egional soft tissue gas as above. MRI suggested for further assessment of possible osteomyelitis.



Reported By: David Berkowitz 

Electronically Signed:  3/9/2020 8:39 PM

## 2020-03-09 NOTE — RAD
CHEST ONE VIEW:

 

History: Congestion 

 

Comparison: 7-14-16

 

FINDINGS: 

Lungs are clear. No pneumothorax or effusion.  No acute osseous abnormality. 

 

IMPRESSION: 

No acute intrathoracic abnormality. 

 

POS: HOME

## 2020-03-10 NOTE — CON
DATE OF CONSULTATION:  



HISTORY OF PRESENT ILLNESS:  Martin Reyes Villanueva is a 39-year-old male,

insulin-dependent diabetic with good peripheral pulses.  Has a diabetic left foot

infection involving the fourth toe.  He has a gangrenous dime-sized ulceration over

the lateral left fourth toe with foul-smelling purulent discharge, tracking to the

dorsum of the foot proximally and cellulitis of the foot over the dorsum extending

to the ankle and fluctuance over the dorsum of the foot.  On probing the wound, the

bone is exposed and involved.  On the fifth toe adjacent, he has lateral portion of

callus that protrudes and probably was responsible for rotation and there is

purulent discharge.  The patient has had previous amputations on the right foot and

is ambulatory.  Dr. Farr has been consulted with consult pending.  Family wants to

talk to Dr. Farr and is hoping this current infectious problem can be resolved with

intravenous antibiotics without surgery.  I have talked to Dr. Farr, who will see

the patient.  I have told the family that this will not resolve with antibiotics

alone.  He will need surgical intervention to salvage his foot and prevent more

proximal amputation.  Foot x-ray reveals gas in the soft tissue.  The patient is on

Zosyn and vancomycin. 



MEDICATIONS AT HOME:  

1. Clonidine.

2. Ibuprofen.

3. Insulin.



SOCIAL HISTORY:  Tobacco, occasionally.  Alcohol, rarely.



PAST SURGICAL HISTORY:  Amputations of toes, right foot first, second toes, and

metatarsals.  This was performed by me in 2017.  He has been seen by Dr. Babin

in March 2014 for distal esophagitis and an EGD. 



REVIEW OF SYSTEMS:  Noncontributory.



PHYSICAL EXAMINATION:

VITAL SIGNS:  Height 5 foot 9 inches, 180 pounds, 27 BMI, temperature 100.4 degrees,

pulse 74, 98% saturation, and blood pressure 119/78. 

HEAD, EARS, EYES, NOSE, AND THROAT:  Unremarkable. 

LUNGS:  Clear to auscultation. 

CARDIAC:  Regular rate and rhythm without murmur or gallop. 

ABDOMEN:  Soft and nontender.  No masses. 

EXTREMITIES:  Palpable femoral, popliteal, pedal pulses heard on his feet.  Left

foot fourth toe reveals; lateral to the fourth toe, there is a dime-sized

ulceration, gangrenous, extending down to the phalanx.  On probing, this

communicates to the dorsum of the distal foot and has foul-smelling purulent

discharge.  The patient on the fifth toe, has a medial raised area with callus with

purulent discharge, but the remainder of the toe looks fairly healthy. 



LABORATORY DATA:  White count 7, hemoglobin 11.  BUN and creatinine are normal.

Sodium 135. 



ASSESSMENT AND PLAN:  

1. Diabetic severe infection, left fourth toe with osteomyelitis and undermining of

the sinus tract and wet gangrene with gas formation seen on x-ray.  We would

recommend emergent intervention today with amputation of left fourth toe and

metatarsal.  I have talked to Dr. Farr and have reiterated to the family that

surgery is emergency to salvage his foot and prevent more proximal amputation.  I

have told them, decision intraoperatively will have to be made as to what exactly is

to be done about the fourth toe.  Debridement of the fifth toe at minimum is

necessary, may be partial amputation of fifth toe, which will require intraoperative

decision-making.  Family consents.  We will plan that today. 

2. Diabetes mellitus, insulin-dependent.

3. Neuropathy.







Job ID:  [id number]

## 2020-03-10 NOTE — PRG
DATE OF SERVICE:  03/10/2020



Martin Reyes Villanueva was seen earlier today.  He has a diabetic infection severe

fourth toe.  This extends to the dorsum of his foot, cellulitis to the ankle, it is

purulent.  He has an open wound with exposed phalanxes fourth toe with a sinus tract

over the distal dorsum of the foot.  There is foul-smelling purulent discharge.  He

has had fever.  There is gas in the soft tissues on x-ray.  I have recommended an

urgent amputation of the fourth toe and metatarsal and drainage of the adjacent

fifth toe, which has a small callus.  An abscess is probably precipitated this

problem on the adjacent toe.  The patient is British-speaking only.  His wife and I

believe his daughter in the room are decision makers.  They state that they want to

continue antibiotic treatment and avoid operation as they hear that this can work.

I have told him he could risks more proximal amputation by procrastination of this

severe diabetic infection.  They have seen Dr. Farr and Dr. Farr did recommend

amputation, but the family is adamant they wanted to try antibiotics.  I will see

the patient in the future when as needed whenever medical failure occurs. 







Job ID:  [id number]

## 2020-03-10 NOTE — CON
DATE OF CONSULTATION:  03/10/2020



REASON FOR CONSULTATION:  Left foot infection.



HISTORY OF PRESENT ILLNESS:  A 39-year-old with history of type 2 diabetes and prior

right first toe amputation, who has developed inflammatory process in the fourth toe

left foot associated with swelling over the past week.  The patient is admitted, he

was evaluated by Dr. Millan.  The patient and the patient's family wanted to attempt

conservative approach to therapy and declined amputation.  Currently, he denies any

headaches, visual symptoms, sore throat, odynophagia, or dysphagia.  No cough or

sputum production.  No chest pain.  No abdominal pain or diarrhea.  No genitourinary

symptoms.  No other joint symptoms. 



PAST MEDICAL HISTORY:  Type 2 diabetes, hypertension, and prior right first toe

amputation for management of inflammatory complications related to the above. 



SOCIAL HISTORY:  Works as a .  .  Smokes a 1 or 2 cigarettes per day.



ALLERGIES:  NONE.



CURRENT MEDICATIONS:  

1. Insulin.

2. Catapres.

3. Pepcid.

4. Zofran.

5. Zosyn.

6. Vancomycin.



PHYSICAL EXAMINATION:

VITAL SIGNS:  T-max 100.4, /80, pulse 69, respirations 16, and O2 saturation

96. 

SKIN:  Shows the area of erythema in the dorsal aspect of the left forefoot

extending from the fourth digit as well as the interdigital space in the lateral

aspect of the fourth digit.  There is an ulcerated area with necrosis, which seems

to extend all the way to the bone.  I did not probe it, but Dr. Millan probed it and

apparently it does.  No other areas of involvement at this time noted.  No

lymphadenopathy. 

HEENT:  Normal. 

NECK:  Supple. 

LUNGS:  Symmetric clear breath sounds. 

HEART:  S1 and S2.  Regular rate.  No S3 or S4. 

ABDOMEN:  Soft, not distended or tender.  No ascites.  No bladder distention. 

EXTREMITIES:  Moves extremities equally.  Pulses are 1+ in dorsalis pedis.  Cap

refill normal. 

NEURO:  Nonfocal including cognitive function.



LABORATORY DATA:  White cell count 10.6 and 7.4, hemoglobin 13 and 11, platelets

186, and a 75% neutrophils and now down to 74%.  Creatinine 1.17 and sodium 135.

Liver profile normal.  Alkaline phosphatase 180 and albumin 3.9.  Urinalysis with 4

to 6 wbc's.  Two sets of blood cultures are no growth thus far.  Foot x-ray on

admission showed a soft tissue swelling, possible soft tissue gas.  There is subtle

decreased definition to cortical bone near the base of the fourth middle phalanx.

Chest x-ray with no acute intrathoracic abnormality. 



ASSESSMENT AND PLAN:  Type 2 diabetes, neuropathy with ulcer lateral aspect of the

left fourth digit with osteomyelitis likely, good vascular supply.  Discussed

options for management.  Family has declined amputation.  We will pursue

antimicrobial therapy in an attempt to salvage the toe.  If that fails, then they

would probably agree to amputation.  Cultures were submitted and I will determine

oral versus IV antimicrobial therapy accordingly. 







Job ID:  [id number]

## 2020-03-10 NOTE — HP
CHIEF COMPLAINT:  Left foot swelling.



HISTORY OF PRESENT ILLNESS:  The patient is a 39-year-old  male with

diabetes mellitus type 2 with diabetic foot infection in the past, presented to the

emergency room with above complaints. 



The patient was admitted at this facility in 2017 for diabetic foot infection.  He

was diagnosed with right great toe osteomyelitis requiring amputation.  He also had

amputation of the second toe by Dr. Millan.  He then followed up with [facility] as

outpatient. 



Over the last 1 week, the patient noticed gradual worsening swelling along with

erythema and tenderness of the left foot.  There was increased swelling and drainage

of the fourth toe of the left foot.  He also noticed some ulceration between the

fourth and the fifth toes.  He felt feverish, however, did not record his

temperature.  There was no nausea, vomiting, chest pain, or shortness of breath

reported.  He reports his pain as 5/10, worse with movement. 



In the emergency room, his initial vital signs showed temperature of 101.1,

respirations of 18, pulse of 101 with a blood pressure of 146/84, O2 saturation of

97% on room air.  He received cefepime, vancomycin, and clindamycin in the emergency

room with IV fluids. 



PAST MEDICAL HISTORY:  

1. Diabetes mellitus type 2.

2. Hypertension.

3. Anxiety.



PAST SURGICAL HISTORY:  Amputation of the first and the second toes of the right

foot. 



ALLERGIES:  NO KNOWN DRUG ALLERGIES.



SOCIAL HISTORY:  The patient currently lives at home.  He drinks alcohol socially.

Denies any smoking or drug use. 



FAMILY HISTORY:  Positive for diabetes.



REVIEW OF SYSTEMS:  All other review of systems was reviewed and was found negative.



PHYSICAL EXAMINATION:

VITAL SIGNS:  As discussed above. 

GENERAL:  A 39-year-old male, ill-appearing, in no apparent distress. 

HEENT:  Head, atraumatic and normocephalic.  Sclerae anicteric.  Moist mucous

membranes.  No oral lesion. 

NECK:  Supple.  No JVD appreciated.  No carotid bruit. 

LUNGS:  Clear to auscultation bilaterally.  No wheezing, rales, or rhonchi. 

HEART:  S1 and S2 present.  Regular rate and rhythm.  No rubs or gallops. 

ABDOMEN:  Soft, nontender.  Bowel sounds present. 

EXTREMITIES:  No calf tenderness or edema.  There is significant erythema along with

tenderness over the left dorsum.  There is swelling of the fourth toe with

ulceration between the fourth and the fifth toes.  There was warmth and tenderness

on the left foot. 

SKIN:  As discussed above. 

LYMPH NODE:  No palpable lymph nodes in the neck or groin. 

PERIPHERAL VASCULAR:  Radial pulses palpable bilaterally. 

NEUROLOGIC:  Grossly nonfocal.  Moves all 4 extremities. 

PSYCHIATRIC:  Alert, awake, and oriented x3.



LABORATORY DATA:  CBC showed WBC 10.6 with hemoglobin 13, hematocrit 38.2, and

platelet 229. 



PT of 15.2, INR 1.2. 



Chemistry showed sodium 135, potassium 3.8, chloride 101, bicarb 26, BUN 14,

creatinine 1.17. 



A1c 10.4. 



CRP 16.6.  Lactic acid 0.9.  Alkaline phosphatase 180. 



Urinalysis showed 4-6 wbc's with squamous epithelial cells and hyaline casts.



IMAGING STUDIES:  Chest x-ray by my review was negative for infiltrate. 



Left foot x-ray showed soft tissue swelling of the fourth toe suggesting cellulitis

with possible punctate foci of regional soft tissue gas. 



IMPRESSION:  

1. Diabetic foot infection/cellulitis, rule out osteomyelitis.

2. Diabetes mellitus type 2, uncontrolled.

3. Dyslipidemia.

4. Hypertension.

5. Anxiety.

6. Hyponatremia.

7. Chronic kidney disease, stage 2.

8. Chronic anemia, suspected due to nutritional deficiency.



PLAN:  The patient will be monitored on the medical floor.  We will start him on

vancomycin along with Zosyn.  Consult Infectious Disease, Dr. Farr.  Consult Wound

Care.  IV hydration.  Resume his home medications.  DVT prophylaxis.  Vital signs

q.4 hourly.  Blood cultures have been sent.  Monitor vancomycin level. 



The patient and the family understand the above plan of care.







Job ID:  [id number]

## 2020-03-10 NOTE — PDOC.HOSPP
- Subjective


Encounter Date: 03/10/20


Encounter Time: 10:55


Subjective: 





pt family's very qpprehensive about sux intervention, wants to avoid it if 

possible.  explained pros and cons including loss of foot, if the toe is not 

amputated. Dr. mcfarland is also visiting during my rounds. pt speaks little 

english.





- Objective


Vital Signs & Weight: 


 Vital Signs (12 hours)











  Temp Pulse Resp BP Pulse Ox


 


 03/10/20 11:10  98.7 F  69  16  127/80  96


 


 03/10/20 08:00      98


 


 03/10/20 07:25  98.7 F  74  18  119/78  98


 


 03/10/20 04:17  100.4 F H  80  16  127/79  97








 Weight











Weight                         188 lb 11.2 oz














Result Diagrams: 


 03/10/20 05:19





 03/09/20 19:02


Additional Labs: 


 Accuchecks











  03/10/20





  04:25


 


POC Glucose  197 H














Hospitalist ROS





- Medication


Medications: 


Active Medications











Generic Name Dose Route Start Last Admin





  Trade Name Freq  PRN Reason Stop Dose Admin


 


Acetaminophen  650 mg  03/10/20 03:18  03/10/20 03:43





  Tylenol  PO   650 mg





  Q4H PRN   Administration





  Headache/Fever or Mild Pain   





     





     





     


 


Famotidine  20 mg  03/10/20 09:00  03/10/20 08:03





  Pepcid  PO   20 mg





  BID KENNY   Administration





     





     





     





     


 


Vancomycin HCl 1.5 gm/ Device  300 mls @ 200 mls/hr  03/10/20 08:00  03/10/20 08

:01





  IVPB   300 mls





  0800,2000 KENNY   Administration





     





     





     





     


 


Piperacillin Sod/Tazobactam  100 mls @ 200 mls/hr  03/10/20 04:00  03/10/20 10:

25





  Sod 3.375 gm/ Sodium Chloride  IVPB   100 mls





  0400,1000,1600,2200 KENNY   Administration





     





     





     





     


 


Sodium Chloride  1,000 mls @ 100 mls/hr  03/10/20 11:00  03/10/20 11:36





  Normal Saline 0.9%  IV   1,000 mls





  .Q10H KENNY   Administration





     





     





     





     


 


Saccharomyces Boulardii  250 mg  03/10/20 09:00  03/10/20 08:03





  Florastor  PO   250 mg





  DAILY KENNY   Administration





     





     





     





     


 


Sodium Chloride  10 ml  03/10/20 09:00  03/10/20 08:06





  Flush - Normal Saline  IVF   10 ml





  Q12HR KENNY   Administration





     





     





     





     














- Exam


General Appearance: NAD, awake alert


Eye: PERRL


ENT: normocephalic atraumatic


Neck: supple


Heart: RRR


Respiratory: CTAB, normal chest expansion


Gastrointestinal: soft, normal bowel sounds


Extremities - other findings: left 4th toe - sinus tract and 5th oe medial side 

callus and pus





Hosp A/P





- Plan





Diabetic foot infection, left


chronic non-healing ulcer on 4th adn 5th toe medial side


OM of 4th toe with sinus tract and cellutlisis


Wet gangrene w..g as formation


--tierra


-vanc + zosyn


-ID and sux follows





DM2


-a1c 10.4


-SSI, Insulin-diabetic diet





diabetic neuropathy


HTN


Hyponatremia - mild


elevated alk phos d/t OM

## 2020-03-11 NOTE — PDOC.HOSPP
- Subjective


Encounter Date: 03/11/20


Encounter Time: 10:30


Subjective: 





pt's foot more swollen and foul smell just nearby him. talk to his wife at 

length, rather loose a toe than a foot.  she is LISTENING and says that they 

would consider talking to surgeon again.  d/w RN. Vanc dose increased. 





- Objective


Vital Signs & Weight: 


 Vital Signs (12 hours)











  Temp Pulse Resp BP Pulse Ox


 


 03/11/20 10:42  98.6 F    


 


 03/11/20 07:15  99.0 F  75  18  125/78  97


 


 03/11/20 04:00  98.9 F  73  20  117/75  98








 Weight











Admit Weight                   188 lb 11.2 oz


 


Weight                         188 lb 11.2 oz














I&O: 


 











 03/10/20 03/11/20 03/12/20





 06:59 06:59 06:59


 


Intake Total  2900 


 


Balance  2900 











Result Diagrams: 


 03/11/20 06:48





 03/11/20 06:48


Additional Labs: 


 Accuchecks











  03/11/20 03/11/20 03/10/20





  11:19 04:21 22:48


 


POC Glucose  223 H  166 H  209 H














  03/10/20 03/10/20





  19:51 17:12


 


POC Glucose  263 H  225 H














Hospitalist ROS





- Medication


Medications: 


Active Medications











Generic Name Dose Route Start Last Admin





  Trade Name Freq  PRN Reason Stop Dose Admin


 


Acetaminophen  650 mg  03/10/20 03:18  03/11/20 08:41





  Tylenol  PO   650 mg





  Q4H PRN   Administration





  Headache/Fever or Mild Pain   





     





     





     


 


Clonidine  0.1 mg  03/10/20 21:00  03/10/20 20:24





  Catapres  PO   0.1 mg





  HS KENNY   Administration





     





     





     





     


 


Famotidine  20 mg  03/10/20 09:00  03/11/20 08:41





  Pepcid  PO   20 mg





  BID KENNY   Administration





     





     





     





     


 


Piperacillin Sod/Tazobactam  100 mls @ 200 mls/hr  03/10/20 04:00  03/11/20 10:

40





  Sod 3.375 gm/ Sodium Chloride  IVPB   100 mls





  0400,1000,1600,2200 KENNY   Administration





     





     





     





     


 


Sodium Chloride  1,000 mls @ 100 mls/hr  03/10/20 11:00  03/11/20 08:39





  Normal Saline 0.9%  IV   1,000 mls





  .Q10H KENNY   Administration





     





     





     





     


 


Vancomycin HCl 1.75 gm/ Sodium  500 mls @ 250 mls/hr  03/11/20 08:00  03/11/20 

08:38





  Chloride  IVPB   500 mls





  0800,2000 KENNY   Administration





     





     





     





     


 


Insulin Human Regular  0 units  03/10/20 06:42  03/11/20 11:56





  Humulin R  SC   3 unit





  .MILD SLIDING SCALE PRN   Administration





  Mild Correctional Scale   





     





     





     


 


Insulin Human Regular  0 units  03/10/20 06:42  03/10/20 20:32





  Humulin R  SC   3 units





  .BEDTIME SLIDING SC PRN   Administration





  Bedtime Correctional Scale   





     





     





     


 


Saccharomyces Boulardii  250 mg  03/10/20 09:00  03/11/20 08:41





  Florastor  PO   250 mg





  DAILY KENNY   Administration





     





     





     





     


 


Sodium Chloride  10 ml  03/10/20 09:00  03/11/20 08:57





  Flush - Normal Saline  IVF   Not Given





  Q12HR [facility]   





     





     





     





     


 


Tramadol HCl  50 mg  03/10/20 06:44  03/11/20 08:41





  Ultram  PO   50 mg





  Q4H PRN   Administration





  Moderate Pain (4-6)   





     





     





     














- Exam


General Appearance: NAD, awake alert


Eye: PERRL


ENT: normocephalic atraumatic


Neck: supple


Heart: RRR


Respiratory: CTAB, normal chest expansion


Gastrointestinal: soft, normal bowel sounds


Extremities - other findings: left 4th toe - more discolored today than y'day 

and more foul smell





Hosp A/P





- Plan





Diabetic foot infection, left


chronic non-healing ulcer on 4th adn 5th toe medial side


OM of 4th toe with sinus tract and cellutlisis


Wet gangrene w..gas formation


--tierra


-vanc + zosyn


-ID and sux follows


-toe - abscess- strep agalati...


-bl cx neg.


-hopefully pt will agree for sux intervention


-spent more time today talking to his wife as well about the urgency and the 

need for sux. 








DM2


-a1c 10.4


-SSI, Insulin-diabetic diet


-NPH added





Diabetic neuropathy--not on meds yet. ---added pregabalin


Protneuria


Glucosuria





HTN--clonidine


Hyponatremia - mild-resolved


Elevated alk phos d/t OM

## 2020-03-12 NOTE — OP
DATE OF PROCEDURE:  03/12/2020



PREOPERATIVE DIAGNOSES:  

1. 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

2. Diabetic foot infection.

3. Osteomyelitis.

4. Diabetic ulcer and callus, medial left 5th toe.



PROCEDURES PERFORMED:  

1. Debridement of left 5th toe medial callus, revealing underlying tissue to be

healthy and without deep penetration or sinus. 

2. Amputation of left 4th toe and metatarsal with debridement and washout with wound

left open for healing by secondary intention.  Wound care team placing a wound VAC. 



ANESTHESIA:  General.



DESCRIPTION OF PROCEDURE:  The patient was taken to the operating room where under

general anesthesia, left lower extremity was prepared with ChloraPrep and draped in

routine fashion.  The callus over the medial left 5th toe was debrided sharply,

under this was healthy tissue without sinus tract.  Amputation of left 4th toe

undertaken with a racquet incision, preserving plantar skin, excising the left 4th

toe and metatarsal, resecting the metatarsal with a bone cutter and proximally with

a rongeur, debriding connective tissue sharply, gaining hemostasis with cautery.

Deep purulent material was evacuated, noted and debrided.  Healthy tissue remained.

There was good bleeding evident by palpable pedal pulses and hair growth on his

feet.  Wound care team arrived to place wound VAC.  The patient tolerated the

procedure well. 







Job ID:  [id number]

## 2020-03-12 NOTE — PDOC.HOSPP
- Subjective


Encounter Date: 03/12/20


Encounter Time: 11:55


Subjective: 





s/p 4th toe amputation, wound vac, 5th toe partial debridement; BG acceptable. 





- Objective


Vital Signs & Weight: 


 Vital Signs (12 hours)











  Temp Pulse Resp BP Pulse Ox


 


 03/12/20 13:47  97.6 F  83  16  149/91 H  94 L


 


 03/12/20 07:15  98.9 F  67  18  130/83  97


 


 03/12/20 05:19  98.5 F  86  14  135/82  97








 Weight











Admit Weight                   188 lb 11.2 oz


 


Weight                         188 lb 11.2 oz














I&O: 


 











 03/11/20 03/12/20 03/13/20





 06:59 06:59 06:59


 


Intake Total 2900  


 


Balance 2900  











Result Diagrams: 


 03/11/20 06:48





 03/11/20 06:48


Additional Labs: 


 Accuchecks











  03/12/20 03/11/20





  05:01 20:26


 


POC Glucose  149 H  159 H














Hospitalist ROS





- Medication


Medications: 


Active Medications











Generic Name Dose Route Start Last Admin





  Trade Name Freq  PRN Reason Stop Dose Admin


 


Clonidine  0.1 mg  03/10/20 21:00  03/11/20 21:54





  Catapres  PO   0.1 mg





  HS KENNY   Administration





     





     





     





     


 


Famotidine  20 mg  03/10/20 09:00  03/12/20 11:07





  Pepcid  PO   Not Given





  BID KENNY   





     





     





     





     


 


Ceftriaxone Sodium 2 gm/  100 mls @ 200 mls/hr  03/11/20 16:00  03/12/20 16:01





  Sodium Chloride  IVPB   100 mls





  1600 KENNY   Administration





     





     





     





     


 


Insulin Human Isoph/Insulin Regular  25 units  03/11/20 21:00  03/12/20 11:07





  Humulin 70/30  SC   Not Given





  BID KENNY   





     





     





     





     


 


Insulin Human Regular  0 units  03/10/20 06:42  03/10/20 20:32





  Humulin R  SC   3 units





  .BEDTIME SLIDING SC PRN   Administration





  Bedtime Correctional Scale   





     





     





     


 


Metronidazole  500 mg  03/11/20 21:00  03/12/20 16:01





  Flagyl  PO   500 mg





  TID KENNY   Administration





     





     





     





     


 


Saccharomyces Boulardii  250 mg  03/10/20 09:00  03/12/20 11:08





  Florastor  PO   Not Given





  DAILY KENNY   





     





     





     





     


 


Sodium Chloride  10 ml  03/10/20 09:00  03/12/20 11:08





  Flush - Normal Saline  IVF   Not Given





  Q12HR [facility]   





     





     





     





     


 


Tramadol HCl  50 mg  03/10/20 06:44  03/11/20 08:41





  Ultram  PO   50 mg





  Q4H PRN   Administration





  Moderate Pain (4-6)   





     





     





     














- Exam


General Appearance: NAD, awake alert


Eye: PERRL


ENT: normocephalic atraumatic


Neck: supple


Heart: RRR


Respiratory: CTAB


Gastrointestinal: soft, normal bowel sounds


Extremities - other findings: s/p 4th toe amputation, wound vac, 5th toe 

partial debridement; 





Hosp A/P





- Plan





Diabetic foot infection, left


chronic non-healing ulcer on 4th adn 5th toe medial side


OM of 4th toe with sinus tract and cellutlisis


Wet gangrene w..gas formation


--tierra


-vanc + zosyn


-ID and sux follows


-toe - abscess- strep agalati...


-bl cx neg.


-hopefully pt will agree for sux intervention


-spent more time today talking to his wife as well about the urgency and the 

need for sux. 








DM2


-a1c 10.4


-SSI, Insulin-diabetic diet


-NPH added





Diabetic neuropathy--not on meds yet. ---added pregabalin


Protneuria


Glucosuria





HTN--clonidine


Hyponatremia - mild-resolved


Elevated alk phos d/t OM








12th


 s/p 4th toe amputation, wound vac, 5th toe partial debridement.


wound vac


will narrrow down the abx


monitor BG closely.

## 2020-03-13 NOTE — PRG
DATE OF SERVICE:  03/13/2020



Mr. Urbina is doing well today.  Cultures revealed Strep.  He underwent

amputation of his left 4th toe and debridement of a small callus in the medial 5th

toe.  This is very superficial.  The patient's wound looked good.  The remaining

tissue with that was without cellulitis or obvious infection.  The patient is doing

well postoperatively.  His wound VAC will be changed over the weekend.  I will be

seeing him as needed.  In my opinion, the patient can be discharged home at anytime

after his outpatient wound VAC care is arranged.  He will probably go to [facility] twice a week for charitable care and VAC care.  He can

follow up in my office in 2 to 3 weeks and go home on Augmentin for 10 days p.o.

Weight bear as tolerated.  Postop shoe has been ordered, but not yet acquired for

the patient.  I will see him as needed in this hospitalization.  Please call if

necessary.  Dr. Major is covering the weekend. 







Job ID:  [id number]

## 2020-03-13 NOTE — PDOC.HOSPP
- Subjective


Encounter Date: 03/13/20


Encounter Time: 11:55


Subjective: 





talk to his wife. pt Malagasy speaking only.  D/w Dr. Millan and he is ok with 

pt going home once home wound vac arranged and abx confirmed w.. ID. 





- Objective


Vital Signs & Weight: 


 Vital Signs (12 hours)











  Temp Pulse Resp BP Pulse Ox


 


 03/13/20 11:49  98.7 F  69  18  150/91 H  97


 


 03/13/20 09:52      98


 


 03/13/20 08:00      96


 


 03/13/20 07:14  97.6 F  85  18  151/86 H  98








 Weight











Admit Weight                   188 lb 11.2 oz


 


Weight                         188 lb 11.2 oz














Result Diagrams: 


 03/11/20 06:48





 03/11/20 06:48


Additional Labs: 


 Accuchecks











  03/13/20 03/13/20 03/13/20





  11:03 05:57 00:59


 


POC Glucose  166 H  172 H  271 H














  03/12/20 03/12/20





  19:44 17:23


 


POC Glucose  277 H  220 H














Hospitalist ROS





- Medication


Medications: 


Active Medications











Generic Name Dose Route Start Last Admin





  Trade Name Freq  PRN Reason Stop Dose Admin


 


Clonidine  0.1 mg  03/10/20 21:00  03/12/20 21:45





  Catapres  PO   Not Given





  HS KENNY   





     





     





     





     


 


Famotidine  20 mg  03/10/20 09:00  03/13/20 09:12





  Pepcid  PO   20 mg





  BID KENNY   Administration





     





     





     





     


 


Ceftriaxone Sodium 2 gm/  100 mls @ 200 mls/hr  03/11/20 16:00  03/12/20 16:01





  Sodium Chloride  IVPB   100 mls





  1600 KENNY   Administration





     





     





     





     


 


Insulin Human Isoph/Insulin Regular  25 units  03/11/20 21:00  03/13/20 09:13





  Humulin 70/30  SC   25 unit





  BID KENNY   Administration





     





     





     





     


 


Insulin Human Lispro  0 units  03/11/20 14:18  03/13/20 06:44





  Humalog  SC   2 unit





  .MODERATE SLIDING SC PRN   Administration





  MODERATE SLIDING SCALE   





     





  Protocol   





     


 


Insulin Human Regular  0 units  03/10/20 06:42  03/12/20 21:45





  Humulin R  SC   3 units





  .BEDTIME SLIDING SC PRN   Administration





  Bedtime Correctional Scale   





     





     





     


 


Metronidazole  500 mg  03/11/20 21:00  03/13/20 09:12





  Flagyl  PO   500 mg





  TID KENNY   Administration





     





     





     





     


 


Saccharomyces Boulardii  250 mg  03/10/20 09:00  03/13/20 09:12





  Florastor  PO   250 mg





  DAILY KENNY   Administration





     





     





     





     


 


Sodium Chloride  10 ml  03/10/20 09:00  03/12/20 21:45





  Flush - Normal Saline  IVF   10 ml





  Q12HR KENNY   Administration





     





     





     





     


 


Tramadol HCl  50 mg  03/10/20 06:44  03/11/20 08:41





  Ultram  PO   50 mg





  Q4H PRN   Administration





  Moderate Pain (4-6)   





     





     





     














- Exam


General Appearance: NAD, awake alert


Eye: PERRL


ENT: normocephalic atraumatic


Neck: supple


Heart: RRR


Respiratory: CTAB, normal chest expansion


Gastrointestinal: soft, normal bowel sounds





Hosp A/P





- Plan





Diabetic foot infection, left


chronic non-healing ulcer on 4th adn 5th toe medial side


OM of 4th toe with sinus tract and cellutlisis


Wet gangrene w..gas formation


--tierra


-vanc + zosyn


-ID and sux follows


-toe - abscess- strep agalati...


-bl cx neg.


-hopefully pt will agree for sux intervention


-spent more time today talking to his wife as well about the urgency and the 

need for sux. 








DM2


-a1c 10.4


-SSI, Insulin-diabetic diet


-NPH added





Diabetic neuropathy--not on meds yet. ---added pregabalin


Protneuria


Glucosuria





HTN--clonidine


Hyponatremia - mild-resolved


Elevated alk phos d/t OM








 s/p 4th toe amputation, wound vac, 5th toe partial debridement.


wound vac


will narrrow down the abx


monitor BG closely.





D/w Dr. Millan and he is ok with pt going home once home wound vac arranged and 

abx confirmed w.. ID.

## 2020-03-14 NOTE — PRG
DATE OF SERVICE:  03/14/2020



SUBJECTIVE:  The patient had amputation of the left 4th digit.  The path report has

been released and it showed ischemic ulceration with viable skin and soft tissue

margins.  The operative report was reviewed and there was a callus over the medial

left 5th toe, which was debrided sharply.  There was healthy tissue without sinus

tract.  There was amputation of the left 4th toe carried out, preserving plantar

skin, excising the left 4th toe and metatarsal, resected the metatarsal with a bone

cutter.  There was deep purulent material, which was evacuated and debrided, healthy

tissue remained, and negative pressure dressing was placed.  He is feeling well and

in minimal pain.  No respiratory symptoms or abdominal pain.  Voiding without

difficulty. 



OBJECTIVE:  VITAL SIGNS:  Essentially normal. 

LUNGS:  Clear. 

HEART:  S1 and S2.  Regular rate. 

ABDOMEN:  Soft and not distended. 

EXTREMITIES:  Left foot with a dressing of negative pressure.



LABORATORY DATA:  Cultures with group B strep, Staph aureus which is methicillin

sensitive, group F Streptococcus. 



ASSESSMENT AND DISCUSSION:  

1. Type 2 diabetes.

2. Neuropathy.

3. Ulcer, lateral aspect, left 4th digit, status post ray amputation of 4th ray. 

The patient has quite susceptible geetha isolated and he is eligible for discharge

planning on p.o. Keflex 500 four times daily.  I would treat him for probably 3

weeks to decrease the risk of relapse of the process.  The duration of therapy may

have to be lengthened depending on the clinical response upon followup of the wound

progress. 







Job ID:  [id number]

## 2020-03-14 NOTE — EKG
Test Reason : 

Blood Pressure : ***/*** mmHG

Vent. Rate : 099 BPM     Atrial Rate : 099 BPM

   P-R Int : 114 ms          QRS Dur : 094 ms

    QT Int : 328 ms       P-R-T Axes : 027 008 038 degrees

   QTc Int : 420 ms

 

Normal sinus rhythm

Normal ECG

 

Confirmed by JUAN ANTONIO TUCKER ([id number]),  LINH RODRIGUEZ ([id number]) on 3/14/2020 12:23:08 PM

 

Referred By:             Confirmed By:JUAN ANTONIO ORDOÑEZ

## 2020-03-15 NOTE — DIS
DATE OF ADMISSION:  03/09/2020



DATE OF DISCHARGE:  03/14/2020



DISCHARGE DISPOSITION:  Home.



FOLLOWUP:  

1. Follow up with [facility] next week.

2. Follow up with Infectious Disease, Dr. Farr in 1 to 2 weeks.

3. Follow up with Dr. Millan in 2 weeks.

4. Follow [facility] with [facility] next week.



DISCHARGE MEDICATIONS:  Keflex 500 mg every 6 hourly for 3 weeks. 



The patient was seen and examined on the day of discharge.  Denies any new

complaints.  No chest pain, shortness of breath, palpitations, fever, or chills

reported. 



LABORATORY DATA:  Significant labs; blood cultures were negative. 



Bacterial culture showed Streptococcus group B as well as Staphylococcus aureus

sensitive to cephalosporins. 



BRIEF HOSPITAL COURSE:  The patient is a 39-year-old  male with diabetes

mellitus type 2, presented to the emergency room with left foot swelling.  His

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

metatarsal with open purulent wound.  He was placed on broad-spectrum antibiotics.

He underwent debridement of the left 5th toe medial callus as well as amputation of

the left 4th toe and metatarsal.  Wound VAC was placed.  He has been cleared by

General Surgery and Infectious Disease for discharge.  He has been discharged on

Keflex per Infectious Disease recommendation for 3 weeks.  He was advised to follow

up with General Surgery and Infectious Disease as outpatient. 



FINAL DIAGNOSES:  

1. 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

2. Diabetes mellitus type 2.

3. Dyslipidemia.

4. Hypertension.

5. Anxiety.

6. Chronic kidney disease stage 2.

7. Hyponatremia.

8. Chronic anemia suspected due to nutritional deficiency.



TIME SPENT:  Time coordinating the discharge of this patient was 34 minutes.







Job ID:  [id number]

## 2022-05-13 ENCOUNTER — HOSPITAL ENCOUNTER (INPATIENT)
Dept: HOSPITAL 92 - ERS | Age: 42
LOS: 5 days | Discharge: HOME | DRG: 617 | End: 2022-05-18
Attending: FAMILY MEDICINE | Admitting: FAMILY MEDICINE
Payer: SELF-PAY

## 2022-05-13 VITALS — BODY MASS INDEX: 26.6 KG/M2

## 2022-05-13 DIAGNOSIS — M86.171: ICD-10-CM

## 2022-05-13 DIAGNOSIS — F41.9: ICD-10-CM

## 2022-05-13 DIAGNOSIS — I10: ICD-10-CM

## 2022-05-13 DIAGNOSIS — Z91.19: ICD-10-CM

## 2022-05-13 DIAGNOSIS — Z91.14: ICD-10-CM

## 2022-05-13 DIAGNOSIS — Z79.899: ICD-10-CM

## 2022-05-13 DIAGNOSIS — L97.519: ICD-10-CM

## 2022-05-13 DIAGNOSIS — E11.51: ICD-10-CM

## 2022-05-13 DIAGNOSIS — Z79.4: ICD-10-CM

## 2022-05-13 DIAGNOSIS — L03.116: ICD-10-CM

## 2022-05-13 DIAGNOSIS — E11.69: Primary | ICD-10-CM

## 2022-05-13 DIAGNOSIS — E11.628: ICD-10-CM

## 2022-05-13 DIAGNOSIS — E11.65: ICD-10-CM

## 2022-05-13 DIAGNOSIS — Z20.822: ICD-10-CM

## 2022-05-13 DIAGNOSIS — L02.611: ICD-10-CM

## 2022-05-13 DIAGNOSIS — Z87.891: ICD-10-CM

## 2022-05-13 DIAGNOSIS — E87.1: ICD-10-CM

## 2022-05-13 DIAGNOSIS — E11.621: ICD-10-CM

## 2022-05-13 LAB
ALBUMIN SERPL BCG-MCNC: 3.1 G/DL (ref 3.5–5)
ALP SERPL-CCNC: 260 U/L (ref 40–110)
ALT SERPL W P-5'-P-CCNC: 21 U/L (ref 8–55)
ANION GAP SERPL CALC-SCNC: 14 MMOL/L (ref 10–20)
AST SERPL-CCNC: 15 U/L (ref 5–34)
BASOPHILS # BLD AUTO: 0 THOU/UL (ref 0–0.2)
BASOPHILS NFR BLD AUTO: 0.1 % (ref 0–1)
BILIRUB SERPL-MCNC: 0.6 MG/DL (ref 0.2–1.2)
BUN SERPL-MCNC: 21 MG/DL (ref 8.9–20.6)
CALCIUM SERPL-MCNC: 8.7 MG/DL (ref 7.8–10.44)
CHD RISK SERPL-RTO: 4.8 (ref ?–4.5)
CHLORIDE SERPL-SCNC: 95 MMOL/L (ref 98–107)
CHOLEST SERPL-MCNC: 148 MG/DL
CO2 SERPL-SCNC: 26 MMOL/L (ref 22–29)
CREAT CL PREDICTED SERPL C-G-VRATE: 0 ML/MIN (ref 70–130)
EOSINOPHIL # BLD AUTO: 0.1 THOU/UL (ref 0–0.7)
EOSINOPHIL NFR BLD AUTO: 2 % (ref 0–10)
GLOBULIN SER CALC-MCNC: 3.2 G/DL (ref 2.4–3.5)
GLUCOSE SERPL-MCNC: 511 MG/DL (ref 70–105)
HDLC SERPL-MCNC: 31 MG/DL
HGB BLD-MCNC: 11.1 G/DL (ref 14–18)
LDLC SERPL CALC-MCNC: 87 MG/DL
LYMPHOCYTES # BLD: 1.3 THOU/UL (ref 1.2–3.4)
LYMPHOCYTES NFR BLD AUTO: 18.1 % (ref 21–51)
MCH RBC QN AUTO: 28.5 PG (ref 27–31)
MCV RBC AUTO: 84.8 FL (ref 78–98)
MONOCYTES # BLD AUTO: 0.4 THOU/UL (ref 0.11–0.59)
MONOCYTES NFR BLD AUTO: 5.7 % (ref 0–10)
NEUTROPHILS # BLD AUTO: 5.5 THOU/UL (ref 1.4–6.5)
NEUTROPHILS NFR BLD AUTO: 74.2 % (ref 42–75)
PLATELET # BLD AUTO: 393 THOU/UL (ref 130–400)
POTASSIUM SERPL-SCNC: 4.8 MMOL/L (ref 3.5–5.1)
RBC # BLD AUTO: 3.89 MILL/UL (ref 4.7–6.1)
SODIUM SERPL-SCNC: 130 MMOL/L (ref 136–145)
TRIGL SERPL-MCNC: 148 MG/DL (ref ?–150)
WBC # BLD AUTO: 7.4 THOU/UL (ref 4.8–10.8)

## 2022-05-13 PROCEDURE — 88311 DECALCIFY TISSUE: CPT

## 2022-05-13 PROCEDURE — 0Y6M0Z9 DETACHMENT AT RIGHT FOOT, PARTIAL 1ST RAY, OPEN APPROACH: ICD-10-PCS | Performed by: SURGERY

## 2022-05-13 PROCEDURE — 85652 RBC SED RATE AUTOMATED: CPT

## 2022-05-13 PROCEDURE — 87205 SMEAR GRAM STAIN: CPT

## 2022-05-13 PROCEDURE — 87070 CULTURE OTHR SPECIMN AEROBIC: CPT

## 2022-05-13 PROCEDURE — 96365 THER/PROPH/DIAG IV INF INIT: CPT

## 2022-05-13 PROCEDURE — 80202 ASSAY OF VANCOMYCIN: CPT

## 2022-05-13 PROCEDURE — 85025 COMPLETE CBC W/AUTO DIFF WBC: CPT

## 2022-05-13 PROCEDURE — 36415 COLL VENOUS BLD VENIPUNCTURE: CPT

## 2022-05-13 PROCEDURE — 88305 TISSUE EXAM BY PATHOLOGIST: CPT

## 2022-05-13 PROCEDURE — 96375 TX/PRO/DX INJ NEW DRUG ADDON: CPT

## 2022-05-13 PROCEDURE — 87077 CULTURE AEROBIC IDENTIFY: CPT

## 2022-05-13 PROCEDURE — 86140 C-REACTIVE PROTEIN: CPT

## 2022-05-13 PROCEDURE — 36416 COLLJ CAPILLARY BLOOD SPEC: CPT

## 2022-05-13 PROCEDURE — 96367 TX/PROPH/DG ADDL SEQ IV INF: CPT

## 2022-05-13 PROCEDURE — 80061 LIPID PANEL: CPT

## 2022-05-13 PROCEDURE — 80048 BASIC METABOLIC PNL TOTAL CA: CPT

## 2022-05-13 PROCEDURE — 80053 COMPREHEN METABOLIC PANEL: CPT

## 2022-05-13 PROCEDURE — U0002 COVID-19 LAB TEST NON-CDC: HCPCS

## 2022-05-13 PROCEDURE — 83036 HEMOGLOBIN GLYCOSYLATED A1C: CPT

## 2022-05-13 RX ADMIN — INSULIN LISPRO PRN UNITS: 100 INJECTION, SOLUTION INTRAVENOUS; SUBCUTANEOUS at 21:46

## 2022-05-13 RX ADMIN — VANCOMYCIN HYDROCHLORIDE SCH MLS: 10 INJECTION, POWDER, LYOPHILIZED, FOR SOLUTION INTRAVENOUS at 20:41

## 2022-05-14 LAB
ALBUMIN SERPL BCG-MCNC: 2.7 G/DL (ref 3.5–5)
ALP SERPL-CCNC: 204 U/L (ref 40–110)
ALT SERPL W P-5'-P-CCNC: 17 U/L (ref 8–55)
ANION GAP SERPL CALC-SCNC: 11 MMOL/L (ref 10–20)
AST SERPL-CCNC: 11 U/L (ref 5–34)
BASOPHILS # BLD AUTO: 0 THOU/UL (ref 0–0.2)
BASOPHILS NFR BLD AUTO: 0 % (ref 0–1)
BILIRUB SERPL-MCNC: 0.4 MG/DL (ref 0.2–1.2)
BUN SERPL-MCNC: 14 MG/DL (ref 8.9–20.6)
CALCIUM SERPL-MCNC: 8.5 MG/DL (ref 7.8–10.44)
CHLORIDE SERPL-SCNC: 100 MMOL/L (ref 98–107)
CO2 SERPL-SCNC: 25 MMOL/L (ref 22–29)
CREAT CL PREDICTED SERPL C-G-VRATE: 121 ML/MIN (ref 70–130)
EOSINOPHIL # BLD AUTO: 0.3 THOU/UL (ref 0–0.7)
EOSINOPHIL NFR BLD AUTO: 5.5 % (ref 0–10)
GLOBULIN SER CALC-MCNC: 3.5 G/DL (ref 2.4–3.5)
GLUCOSE SERPL-MCNC: 361 MG/DL (ref 70–105)
HGB BLD-MCNC: 10.5 G/DL (ref 14–18)
LYMPHOCYTES # BLD: 1.4 THOU/UL (ref 1.2–3.4)
LYMPHOCYTES NFR BLD AUTO: 24.8 % (ref 21–51)
MCH RBC QN AUTO: 28.6 PG (ref 27–31)
MCV RBC AUTO: 84.9 FL (ref 78–98)
MONOCYTES # BLD AUTO: 0.3 THOU/UL (ref 0.11–0.59)
MONOCYTES NFR BLD AUTO: 5.5 % (ref 0–10)
NEUTROPHILS # BLD AUTO: 3.6 THOU/UL (ref 1.4–6.5)
NEUTROPHILS NFR BLD AUTO: 64.2 % (ref 42–75)
PLATELET # BLD AUTO: 399 THOU/UL (ref 130–400)
POTASSIUM SERPL-SCNC: 4 MMOL/L (ref 3.5–5.1)
RBC # BLD AUTO: 3.67 MILL/UL (ref 4.7–6.1)
SODIUM SERPL-SCNC: 132 MMOL/L (ref 136–145)
VANCOMYCIN TROUGH SERPL-MCNC: 10 UG/ML
WBC # BLD AUTO: 5.6 THOU/UL (ref 4.8–10.8)

## 2022-05-14 RX ADMIN — INSULIN HUMAN SCH UNIT: 100 INJECTION, SUSPENSION SUBCUTANEOUS at 21:46

## 2022-05-14 RX ADMIN — VANCOMYCIN HYDROCHLORIDE SCH MLS: 10 INJECTION, POWDER, LYOPHILIZED, FOR SOLUTION INTRAVENOUS at 08:05

## 2022-05-14 RX ADMIN — INSULIN LISPRO PRN UNITS: 100 INJECTION, SOLUTION INTRAVENOUS; SUBCUTANEOUS at 05:32

## 2022-05-14 RX ADMIN — INSULIN LISPRO PRN UNITS: 100 INJECTION, SOLUTION INTRAVENOUS; SUBCUTANEOUS at 17:24

## 2022-05-14 RX ADMIN — VANCOMYCIN HYDROCHLORIDE SCH MLS: 10 INJECTION, POWDER, LYOPHILIZED, FOR SOLUTION INTRAVENOUS at 21:42

## 2022-05-15 LAB
ANION GAP SERPL CALC-SCNC: 12 MMOL/L (ref 10–20)
BASOPHILS # BLD AUTO: 0 THOU/UL (ref 0–0.2)
BASOPHILS NFR BLD AUTO: 0.3 % (ref 0–1)
BUN SERPL-MCNC: 11 MG/DL (ref 8.9–20.6)
CALCIUM SERPL-MCNC: 8.6 MG/DL (ref 7.8–10.44)
CHLORIDE SERPL-SCNC: 102 MMOL/L (ref 98–107)
CO2 SERPL-SCNC: 27 MMOL/L (ref 22–29)
CREAT CL PREDICTED SERPL C-G-VRATE: 130 ML/MIN (ref 70–130)
EOSINOPHIL # BLD AUTO: 0.3 THOU/UL (ref 0–0.7)
EOSINOPHIL NFR BLD AUTO: 5.7 % (ref 0–10)
GLUCOSE SERPL-MCNC: 215 MG/DL (ref 70–105)
HGB BLD-MCNC: 10.7 G/DL (ref 14–18)
LYMPHOCYTES # BLD: 1.2 THOU/UL (ref 1.2–3.4)
LYMPHOCYTES NFR BLD AUTO: 26.2 % (ref 21–51)
MCH RBC QN AUTO: 27.6 PG (ref 27–31)
MCV RBC AUTO: 85.3 FL (ref 78–98)
MONOCYTES # BLD AUTO: 0.3 THOU/UL (ref 0.11–0.59)
MONOCYTES NFR BLD AUTO: 6 % (ref 0–10)
NEUTROPHILS # BLD AUTO: 2.9 THOU/UL (ref 1.4–6.5)
NEUTROPHILS NFR BLD AUTO: 61.8 % (ref 42–75)
PLATELET # BLD AUTO: 364 THOU/UL (ref 130–400)
POTASSIUM SERPL-SCNC: 4 MMOL/L (ref 3.5–5.1)
RBC # BLD AUTO: 3.87 MILL/UL (ref 4.7–6.1)
SODIUM SERPL-SCNC: 137 MMOL/L (ref 136–145)
WBC # BLD AUTO: 4.6 THOU/UL (ref 4.8–10.8)

## 2022-05-15 RX ADMIN — INSULIN HUMAN SCH UNIT: 100 INJECTION, SUSPENSION SUBCUTANEOUS at 07:46

## 2022-05-15 RX ADMIN — VANCOMYCIN HYDROCHLORIDE SCH MLS: 10 INJECTION, POWDER, LYOPHILIZED, FOR SOLUTION INTRAVENOUS at 12:49

## 2022-05-15 RX ADMIN — VANCOMYCIN HYDROCHLORIDE SCH MLS: 10 INJECTION, POWDER, LYOPHILIZED, FOR SOLUTION INTRAVENOUS at 06:42

## 2022-05-15 RX ADMIN — INSULIN HUMAN SCH UNIT: 100 INJECTION, SUSPENSION SUBCUTANEOUS at 21:15

## 2022-05-16 RX ADMIN — INSULIN HUMAN SCH UNIT: 100 INJECTION, SUSPENSION SUBCUTANEOUS at 09:21

## 2022-05-16 RX ADMIN — INSULIN LISPRO PRN UNITS: 100 INJECTION, SOLUTION INTRAVENOUS; SUBCUTANEOUS at 17:04

## 2022-05-16 RX ADMIN — DOCUSATE SODIUM 50 MG AND SENNOSIDES 8.6 MG SCH TAB: 8.6; 5 TABLET, FILM COATED ORAL at 09:20

## 2022-05-16 RX ADMIN — INSULIN LISPRO PRN UNITS: 100 INJECTION, SOLUTION INTRAVENOUS; SUBCUTANEOUS at 20:46

## 2022-05-16 RX ADMIN — INSULIN HUMAN SCH UNIT: 100 INJECTION, SUSPENSION SUBCUTANEOUS at 20:47

## 2022-05-17 LAB
ANION GAP SERPL CALC-SCNC: 12 MMOL/L (ref 10–20)
BASOPHILS # BLD AUTO: 0 THOU/UL (ref 0–0.2)
BASOPHILS NFR BLD AUTO: 0.5 % (ref 0–1)
BUN SERPL-MCNC: 12 MG/DL (ref 8.9–20.6)
CALCIUM SERPL-MCNC: 8.7 MG/DL (ref 7.8–10.44)
CHLORIDE SERPL-SCNC: 102 MMOL/L (ref 98–107)
CO2 SERPL-SCNC: 29 MMOL/L (ref 22–29)
CREAT CL PREDICTED SERPL C-G-VRATE: 114 ML/MIN (ref 70–130)
EOSINOPHIL # BLD AUTO: 0.3 THOU/UL (ref 0–0.7)
EOSINOPHIL NFR BLD AUTO: 4.7 % (ref 0–10)
GLUCOSE SERPL-MCNC: 128 MG/DL (ref 70–105)
HGB BLD-MCNC: 11.6 G/DL (ref 14–18)
LYMPHOCYTES # BLD: 1.5 THOU/UL (ref 1.2–3.4)
LYMPHOCYTES NFR BLD AUTO: 25.2 % (ref 21–51)
MCH RBC QN AUTO: 28.5 PG (ref 27–31)
MCV RBC AUTO: 86 FL (ref 78–98)
MONOCYTES # BLD AUTO: 0.3 THOU/UL (ref 0.11–0.59)
MONOCYTES NFR BLD AUTO: 5.7 % (ref 0–10)
NEUTROPHILS # BLD AUTO: 3.8 THOU/UL (ref 1.4–6.5)
NEUTROPHILS NFR BLD AUTO: 64 % (ref 42–75)
PLATELET # BLD AUTO: 368 THOU/UL (ref 130–400)
POTASSIUM SERPL-SCNC: 4.2 MMOL/L (ref 3.5–5.1)
RBC # BLD AUTO: 4.05 MILL/UL (ref 4.7–6.1)
SODIUM SERPL-SCNC: 139 MMOL/L (ref 136–145)
WBC # BLD AUTO: 6 THOU/UL (ref 4.8–10.8)

## 2022-05-17 RX ADMIN — INSULIN LISPRO PRN UNITS: 100 INJECTION, SOLUTION INTRAVENOUS; SUBCUTANEOUS at 19:52

## 2022-05-17 RX ADMIN — INSULIN HUMAN SCH UNIT: 100 INJECTION, SUSPENSION SUBCUTANEOUS at 08:44

## 2022-05-17 RX ADMIN — INSULIN LISPRO PRN UNITS: 100 INJECTION, SOLUTION INTRAVENOUS; SUBCUTANEOUS at 17:04

## 2022-05-17 RX ADMIN — INSULIN HUMAN SCH UNIT: 100 INJECTION, SUSPENSION SUBCUTANEOUS at 19:52

## 2022-05-17 RX ADMIN — DOCUSATE SODIUM 50 MG AND SENNOSIDES 8.6 MG SCH TAB: 8.6; 5 TABLET, FILM COATED ORAL at 08:44

## 2022-05-18 VITALS — TEMPERATURE: 97.4 F | DIASTOLIC BLOOD PRESSURE: 99 MMHG | SYSTOLIC BLOOD PRESSURE: 154 MMHG

## 2022-05-18 RX ADMIN — DOCUSATE SODIUM 50 MG AND SENNOSIDES 8.6 MG SCH TAB: 8.6; 5 TABLET, FILM COATED ORAL at 08:48

## 2022-05-18 RX ADMIN — INSULIN HUMAN SCH UNIT: 100 INJECTION, SUSPENSION SUBCUTANEOUS at 08:48

## 2022-05-18 RX ADMIN — INSULIN LISPRO PRN UNITS: 100 INJECTION, SOLUTION INTRAVENOUS; SUBCUTANEOUS at 11:21

## 2022-05-20 ENCOUNTER — HOSPITAL ENCOUNTER (OUTPATIENT)
Dept: HOSPITAL 92 - CSHWCC | Age: 42
Discharge: HOME | End: 2022-05-20
Attending: NURSE PRACTITIONER
Payer: SELF-PAY

## 2022-05-20 DIAGNOSIS — L97.415: ICD-10-CM

## 2022-05-20 DIAGNOSIS — E11.621: Primary | ICD-10-CM

## 2022-05-20 PROCEDURE — 99203 OFFICE O/P NEW LOW 30 MIN: CPT

## 2022-05-20 PROCEDURE — 29581 APPL MULTLAYER CMPRN SYS LEG: CPT

## 2022-05-20 PROCEDURE — G0463 HOSPITAL OUTPT CLINIC VISIT: HCPCS

## 2022-05-20 PROCEDURE — 97605 NEG PRS WND THER DME<=50SQCM: CPT

## 2022-05-23 ENCOUNTER — HOSPITAL ENCOUNTER (OUTPATIENT)
Dept: HOSPITAL 92 - CSHWCC | Age: 42
Discharge: HOME | End: 2022-05-23
Attending: NURSE PRACTITIONER
Payer: SELF-PAY

## 2022-05-23 DIAGNOSIS — L97.415: ICD-10-CM

## 2022-05-23 DIAGNOSIS — R60.0: ICD-10-CM

## 2022-05-23 DIAGNOSIS — E11.621: Primary | ICD-10-CM

## 2022-05-23 PROCEDURE — 97605 NEG PRS WND THER DME<=50SQCM: CPT

## 2022-05-23 PROCEDURE — 29581 APPL MULTLAYER CMPRN SYS LEG: CPT

## 2022-05-27 ENCOUNTER — HOSPITAL ENCOUNTER (OUTPATIENT)
Dept: HOSPITAL 92 - CSHWCC | Age: 42
Discharge: HOME | End: 2022-05-27
Attending: NURSE PRACTITIONER
Payer: SELF-PAY

## 2022-05-27 DIAGNOSIS — E11.621: Primary | ICD-10-CM

## 2022-05-27 DIAGNOSIS — L97.415: ICD-10-CM

## 2022-05-27 PROCEDURE — 97605 NEG PRS WND THER DME<=50SQCM: CPT

## 2022-05-27 PROCEDURE — G0463 HOSPITAL OUTPT CLINIC VISIT: HCPCS

## 2022-05-27 PROCEDURE — 99212 OFFICE O/P EST SF 10 MIN: CPT

## 2022-05-27 PROCEDURE — 29581 APPL MULTLAYER CMPRN SYS LEG: CPT

## 2022-05-31 ENCOUNTER — HOSPITAL ENCOUNTER (OUTPATIENT)
Dept: HOSPITAL 92 - CSHWCC | Age: 42
Discharge: HOME | End: 2022-05-31
Attending: NURSE PRACTITIONER
Payer: SELF-PAY

## 2022-05-31 DIAGNOSIS — E11.621: Primary | ICD-10-CM

## 2022-05-31 DIAGNOSIS — L97.415: ICD-10-CM

## 2022-05-31 DIAGNOSIS — R60.0: ICD-10-CM

## 2022-05-31 PROCEDURE — 97605 NEG PRS WND THER DME<=50SQCM: CPT

## 2022-05-31 PROCEDURE — 29581 APPL MULTLAYER CMPRN SYS LEG: CPT

## 2022-06-03 ENCOUNTER — HOSPITAL ENCOUNTER (OUTPATIENT)
Dept: HOSPITAL 92 - CSHWCC | Age: 42
Discharge: HOME | End: 2022-06-03
Attending: NURSE PRACTITIONER
Payer: SELF-PAY

## 2022-06-03 DIAGNOSIS — L97.415: ICD-10-CM

## 2022-06-03 DIAGNOSIS — E11.621: Primary | ICD-10-CM

## 2022-06-03 DIAGNOSIS — R60.0: ICD-10-CM

## 2022-06-03 PROCEDURE — 29581 APPL MULTLAYER CMPRN SYS LEG: CPT

## 2022-06-03 PROCEDURE — 97605 NEG PRS WND THER DME<=50SQCM: CPT

## 2022-06-07 ENCOUNTER — HOSPITAL ENCOUNTER (OUTPATIENT)
Dept: HOSPITAL 92 - CSHWCC | Age: 42
Discharge: HOME | End: 2022-06-07
Attending: NURSE PRACTITIONER
Payer: SELF-PAY

## 2022-06-07 DIAGNOSIS — E11.621: Primary | ICD-10-CM

## 2022-06-07 DIAGNOSIS — R60.0: ICD-10-CM

## 2022-06-07 DIAGNOSIS — L97.415: ICD-10-CM

## 2022-06-07 PROCEDURE — 29581 APPL MULTLAYER CMPRN SYS LEG: CPT

## 2022-06-07 PROCEDURE — 97605 NEG PRS WND THER DME<=50SQCM: CPT

## 2022-06-10 ENCOUNTER — HOSPITAL ENCOUNTER (OUTPATIENT)
Dept: HOSPITAL 92 - CSHWCC | Age: 42
Discharge: HOME | End: 2022-06-10
Attending: NURSE PRACTITIONER
Payer: SELF-PAY

## 2022-06-10 DIAGNOSIS — R60.0: ICD-10-CM

## 2022-06-10 DIAGNOSIS — L97.415: ICD-10-CM

## 2022-06-10 DIAGNOSIS — E11.621: Primary | ICD-10-CM

## 2022-06-10 PROCEDURE — 99213 OFFICE O/P EST LOW 20 MIN: CPT

## 2022-06-10 PROCEDURE — G0463 HOSPITAL OUTPT CLINIC VISIT: HCPCS

## 2022-06-10 PROCEDURE — 29581 APPL MULTLAYER CMPRN SYS LEG: CPT

## 2022-06-17 ENCOUNTER — HOSPITAL ENCOUNTER (OUTPATIENT)
Dept: HOSPITAL 92 - CSHWCC | Age: 42
Discharge: HOME | End: 2022-06-17
Attending: NURSE PRACTITIONER
Payer: SELF-PAY

## 2022-06-17 DIAGNOSIS — E11.621: Primary | ICD-10-CM

## 2022-06-17 DIAGNOSIS — L97.415: ICD-10-CM

## 2022-06-17 DIAGNOSIS — R60.0: ICD-10-CM

## 2022-06-17 PROCEDURE — G0463 HOSPITAL OUTPT CLINIC VISIT: HCPCS

## 2022-06-17 PROCEDURE — 99213 OFFICE O/P EST LOW 20 MIN: CPT

## 2022-07-05 ENCOUNTER — HOSPITAL ENCOUNTER (OUTPATIENT)
Dept: HOSPITAL 92 - CSHWCC | Age: 42
Discharge: HOME | End: 2022-07-05
Attending: NURSE PRACTITIONER
Payer: SELF-PAY

## 2022-07-05 DIAGNOSIS — R60.0: ICD-10-CM

## 2022-07-05 DIAGNOSIS — E11.621: Primary | ICD-10-CM

## 2022-07-05 DIAGNOSIS — L97.415: ICD-10-CM

## 2022-07-19 ENCOUNTER — HOSPITAL ENCOUNTER (OUTPATIENT)
Dept: HOSPITAL 92 - CSHWCC | Age: 42
Discharge: HOME | End: 2022-07-19
Attending: NURSE PRACTITIONER
Payer: SELF-PAY

## 2022-07-19 DIAGNOSIS — E11.621: Primary | ICD-10-CM

## 2022-07-19 DIAGNOSIS — R60.0: ICD-10-CM

## 2022-07-19 DIAGNOSIS — L97.415: ICD-10-CM
